# Patient Record
Sex: FEMALE | Race: WHITE | NOT HISPANIC OR LATINO | Employment: OTHER | URBAN - METROPOLITAN AREA
[De-identification: names, ages, dates, MRNs, and addresses within clinical notes are randomized per-mention and may not be internally consistent; named-entity substitution may affect disease eponyms.]

---

## 2018-06-08 ENCOUNTER — HOSPITAL ENCOUNTER (EMERGENCY)
Facility: HOSPITAL | Age: 68
Discharge: HOME | End: 2018-06-08
Attending: EMERGENCY MEDICINE | Admitting: EMERGENCY MEDICINE
Payer: COMMERCIAL

## 2018-06-08 ENCOUNTER — APPOINTMENT (EMERGENCY)
Dept: RADIOLOGY | Facility: HOSPITAL | Age: 68
End: 2018-06-08
Payer: COMMERCIAL

## 2018-06-08 VITALS
HEART RATE: 69 BPM | SYSTOLIC BLOOD PRESSURE: 127 MMHG | DIASTOLIC BLOOD PRESSURE: 57 MMHG | WEIGHT: 243.39 LBS | TEMPERATURE: 98.2 F | OXYGEN SATURATION: 99 % | RESPIRATION RATE: 18 BRPM | BODY MASS INDEX: 44.79 KG/M2 | HEIGHT: 62 IN

## 2018-06-08 DIAGNOSIS — M25.562 LEFT KNEE PAIN: Primary | ICD-10-CM

## 2018-06-08 PROCEDURE — 99284 EMERGENCY DEPT VISIT MOD MDM: CPT

## 2018-06-08 PROCEDURE — 73562 X-RAY EXAM OF KNEE 3: CPT

## 2018-06-08 RX ORDER — NAPROXEN 500 MG/1
500 TABLET ORAL 2 TIMES DAILY WITH MEALS
Qty: 30 TABLET | Refills: 0 | Status: SHIPPED | OUTPATIENT
Start: 2018-06-08 | End: 2018-08-20

## 2018-06-08 RX ORDER — MELATONIN
1000 DAILY
COMMUNITY

## 2018-06-08 RX ORDER — ASPIRIN 325 MG
325 TABLET ORAL DAILY
COMMUNITY
End: 2021-03-17 | Stop reason: DRUGHIGH

## 2018-06-08 RX ORDER — POTASSIUM CHLORIDE 20 MEQ/1
20 TABLET, EXTENDED RELEASE ORAL DAILY
COMMUNITY
End: 2021-03-17 | Stop reason: CLARIF

## 2018-06-08 RX ORDER — METOPROLOL TARTRATE 50 MG/1
50 TABLET, FILM COATED ORAL EVERY 12 HOURS SCHEDULED
COMMUNITY
End: 2021-03-17 | Stop reason: CLARIF

## 2018-06-08 RX ORDER — LEVOTHYROXINE SODIUM 0.15 MG/1
200 TABLET ORAL DAILY
COMMUNITY

## 2018-06-08 RX ORDER — HYDROCHLOROTHIAZIDE 25 MG/1
25 TABLET ORAL DAILY
COMMUNITY

## 2018-06-08 RX ORDER — NAPROXEN 250 MG/1
500 TABLET ORAL ONCE
Status: COMPLETED | OUTPATIENT
Start: 2018-06-08 | End: 2018-06-08

## 2018-06-08 RX ADMIN — NAPROXEN 500 MG: 250 TABLET ORAL at 13:49

## 2018-06-08 NOTE — ED PROVIDER NOTES
Pt Name: Gema Martinez  MRN: [id number]  [place] 1950  Age/Sex: 75 y o  female  Date of evaluation: 6/8/2018  PCP: Leonardo Villarreal, [address] [place] [address]    Chief Complaint   Patient presents with    Knee Pain     Patient states that she went shopping on Wednesday and she was just walking and felt "something" in her knee    She states that she has trouble walking on it now         GIOVANNI Perry presents to the Emergency Department complaining of left knee pain  On Wednesday she felt a sudden pain while walking down a few steps  Since then she felt that it has gotten harder and harder to walk on it  She started to use a walker but still has significant pain  HPI      Past Medical and Surgical History    Past Medical History:   Diagnosis Date    Diabetes mellitus ([facility] [address] )     Type 1    Hyperlipidemia     Hypertension        Past Surgical History:   Procedure Laterality Date    CARDIAC PACEMAKER PLACEMENT         History reviewed  No pertinent family history  Social History   Substance Use Topics    Smoking status: Never Smoker    Smokeless tobacco: Never Used    Alcohol use No              Allergies    No Known Allergies    Home Medications    Prior to Admission medications    Not on File           Review of Systems    Review of Systems   Constitutional: Negative for activity change, appetite change, chills, diaphoresis, fatigue and fever  HENT: Negative for congestion, postnasal drip, rhinorrhea, sinus pressure, sneezing and sore throat  Eyes: Negative for pain and visual disturbance  Respiratory: Negative for cough, chest tightness and shortness of breath  Cardiovascular: Negative for chest pain, palpitations and leg swelling  Gastrointestinal: Negative for abdominal distention, abdominal pain, constipation, diarrhea, nausea and vomiting  Endocrine: Negative for polydipsia, polyphagia and polyuria     Genitourinary: Negative for decreased urine volume, difficulty urinating, dysuria, flank pain, frequency and hematuria  Musculoskeletal: Negative for arthralgias, gait problem, joint swelling and neck pain  Skin: Negative for pallor and rash  Allergic/Immunologic: Negative for immunocompromised state  Neurological: Negative for syncope, speech difficulty, weakness, light-headedness, numbness and headaches  All other systems reviewed and are negative  Physical Exam      ED Triage Vitals [06/08/18 1247]   Temperature Pulse Respirations Blood Pressure SpO2   98 2 °F (36 8 °C) 69 18 127/57 99 %      Temp src Heart Rate Source Patient Position - Orthostatic VS BP Location FiO2 (%)   -- Monitor Lying Right arm --      Pain Score       Worst Possible Pain               Physical Exam   Constitutional: She is oriented to person, place, and time  She appears well-developed and well-nourished  No distress  HENT:   Head: Normocephalic and atraumatic  Nose: Nose normal    Mouth/Throat: Oropharynx is clear and moist    Eyes: Conjunctivae, EOM and lids are normal  Pupils are equal, round, and reactive to light  Neck: Normal range of motion  Neck supple  Cardiovascular: Normal rate, regular rhythm and normal heart sounds  Exam reveals no gallop and no friction rub  No murmur heard  Pulmonary/Chest: Effort normal and breath sounds normal  No accessory muscle usage  No respiratory distress  She has no wheezes  She has no rales  Abdominal: Soft  She exhibits no distension  There is no tenderness  There is no rebound and no guarding  Musculoskeletal:        Left knee: She exhibits normal range of motion, no swelling, no effusion, no ecchymosis, no deformity, no laceration, no erythema, normal patellar mobility and no bony tenderness  Tenderness found  Medial joint line and MCL tenderness noted  No lateral joint line tenderness noted  Legs:  Neurological: She is alert and oriented to person, place, and time  No cranial nerve deficit or sensory deficit     Skin: Skin is warm and dry  No rash noted  She is not diaphoretic  No erythema  Psychiatric: She has a normal mood and affect  Her speech is normal and behavior is normal  Judgment and thought content normal    Nursing note and vitals reviewed  Assessment and Plan    Flower Suarez is a 75 y o  female who presents with left knee pain  Physical examination remarkable for tenderness medially  Differential diagnosis (not completely inclusive) includes ligamentous or meniscal injury  Plan will be to perform diagnostic testing and treat symptomatically  MDM    Diagnostic Results    EKG INTERPRETATION    Labs:    No results found for this or any previous visit  All labs reviewed and utilized in the medical decision making process    Radiology:    XR knee 3 views left non injury   Final Result      No acute osseous abnormality  Workstation performed: [id number]             All radiology studies independently viewed by me and interpreted by the radiologist     Procedure    Procedures    CritCare Time      ED Course of Care and Re-Assessments      Medications   naproxen (NAPROSYN) tablet 500 mg (500 mg Oral Given 6/8/18 1349)           FINAL IMPRESSION    Final diagnoses:   Left knee pain         DISPOSITION/PLAN  Time reflects when diagnosis was documented in both MDM as applicable and the Disposition within this note     Time User Action Codes Description Comment    6/8/2018  2:10 PM Deepti Jain Add [M25 [id number]] Left knee pain       ED Disposition     ED Disposition Condition Comment    Discharge  Flower Suarez discharge to home/self care      Condition at discharge: Good        Follow-up Information     Follow up With Specialties Details Why Contact Info Additional [address] [facility] Schedule an appointment as soon as possible for a visit  [address] [place] [address] Mikie Chambers 42 [phone]       [facility] Emergency Department Emergency Medicine Go to As needed, If symptoms worsen [address]  [phone] [place] ED, [address] [place], [place], [facility]            PATIENT REFERRED TO:    [facility] [place]  [address] [facility] Laura Jin [address]  [phone]  Schedule an appointment as soon as possible for a visit      [address] [facility] Emergency Department  [address] [facility] [address]  [phone]  Go to  As needed, If symptoms worsen      DISCHARGE MEDICATIONS:    Patient's Medications   Discharge Prescriptions    No medications on file       No discharge procedures on file           DO Isrrael Vidal DO  06/08/18 1535

## 2018-06-08 NOTE — ED NOTES
Call placed to daughter, Aleksander Stroud at [phone] for ride home       Kofi Zaidi RN  06/08/18 7746

## 2018-06-08 NOTE — DISCHARGE INSTRUCTIONS

## 2018-08-15 LAB
CREAT ?TM UR-SCNC: 63.5 UMOL/L
HBA1C MFR BLD HPLC: 7.4 %
MICROALBUMIN UR-MCNC: 17.7 MG/L (ref 0–20)
MICROALBUMIN/CREAT UR: 27.9 MG/G{CREAT}

## 2018-08-20 ENCOUNTER — OFFICE VISIT (OUTPATIENT)
Dept: OBGYN CLINIC | Facility: CLINIC | Age: 68
End: 2018-08-20
Payer: COMMERCIAL

## 2018-08-20 VITALS
WEIGHT: 238.4 LBS | DIASTOLIC BLOOD PRESSURE: 78 MMHG | RESPIRATION RATE: 18 BRPM | BODY MASS INDEX: 45.01 KG/M2 | SYSTOLIC BLOOD PRESSURE: 142 MMHG | HEART RATE: 72 BPM | HEIGHT: 61 IN

## 2018-08-20 DIAGNOSIS — S76.312A HAMSTRING MUSCLE STRAIN, LEFT, INITIAL ENCOUNTER: ICD-10-CM

## 2018-08-20 DIAGNOSIS — R29.898 WEAKNESS OF BOTH HIPS: ICD-10-CM

## 2018-08-20 DIAGNOSIS — S86.812A STRAIN OF CALF MUSCLE, LEFT, INITIAL ENCOUNTER: ICD-10-CM

## 2018-08-20 DIAGNOSIS — M22.2X2 PATELLOFEMORAL SYNDROME OF LEFT KNEE: Primary | ICD-10-CM

## 2018-08-20 DIAGNOSIS — M17.12 PRIMARY OSTEOARTHRITIS OF LEFT KNEE: ICD-10-CM

## 2018-08-20 PROCEDURE — 99204 OFFICE O/P NEW MOD 45 MIN: CPT | Performed by: FAMILY MEDICINE

## 2018-08-20 RX ORDER — INSULIN ASPART INJECTION 100 [IU]/ML
64 INJECTION, SOLUTION SUBCUTANEOUS 3 TIMES DAILY
COMMUNITY
Start: 2018-07-12 | End: 2021-10-26

## 2018-08-20 NOTE — PROGRESS NOTES
Assessment/Plan:  Assessment/Plan   Diagnoses and all orders for this visit:    Patellofemoral syndrome of left knee  -     Ambulatory referral to Physical Therapy; Future    Hamstring muscle strain, left, initial encounter  -     Ambulatory referral to Physical Therapy; Future    Strain of calf muscle, left, initial encounter  -     Ambulatory referral to Physical Therapy; Future    Primary osteoarthritis of left knee  -     Ambulatory referral to Physical Therapy; Future  -     diclofenac sodium (VOLTAREN) 1 %; Apply 2 g topically 4 (four) times a day    Weakness of both hips  -     Ambulatory referral to Physical Therapy; Future    Other orders  -     FIASP FLEXTOUCH 100 units/mL injection pen; Inject 64 unit marking on U-100 syringe under the skin 3 (three) times a day        18-year-old female with left knee pain 2 months duration  Discussed with patient physical exam, radiographs, impression, and plan  X-rays noted for mild degenerative changes of the tibial femoral joint  Physical exam is noted for medial and lateral joint line tenderness as well as moderate tenderness of the distal medial lateral hamstrings, and medial gastrocnemius  Clinical impression is patellofemoral syndrome, and strain of the hamstring and calf  I discussed treatment regimen of physical therapy to which she agreed  She is to start home exercise program as soon as possible  I also prescribed her topical Voltaren gel which she is to use as directed  She will follow up with me in 6 weeks at which point she will be re-evaluated  Subjective:   Patient ID: Jaimee Licona is a 75 y o  female  Chief Complaint   Patient presents with    Left Knee - Pain         18-year-old female presents for evaluation of left knee pain of 2 months duration  Pain for started while she was walking down the stairs at the final step she twisted knee    She mainly had pain that was described as localized to the anterior and posterior aspects of the knee, intermittent, sharp, worse with bending the knee, and associated with feeling unstable  Her symptoms worsen over the next few days so she presented to emergency room which provided with a knee immobilizer  She wore the immobilizer for few days and then discontinue it as she was unable to rise from seated position  She reports that pain is worse when rising from a seated position, but then improves as she starts to walk  Knee Pain   This is a new problem  The current episode started more than 1 month ago  The problem occurs intermittently  The problem has been unchanged  Associated symptoms include arthralgias  Pertinent negatives include no abdominal pain, chest pain, chills, fever, joint swelling, numbness, rash, sore throat or weakness  Exacerbated by: Rising  She has tried rest for the symptoms  The treatment provided mild relief  The following portions of the patient's history were reviewed and updated as appropriate: She  has a past medical history of Diabetes mellitus ([facility] [address] ); Hyperlipidemia; and Hypertension  She  has a past surgical history that includes Cardiac pacemaker placement  Her family history is not on file  She  reports that she has never smoked  She has never used smokeless tobacco  She reports that she does not drink alcohol or use drugs  She has No Known Allergies       Review of Systems   Constitutional: Negative for chills and fever  HENT: Negative for sore throat  Eyes: Negative for visual disturbance  Respiratory: Negative for shortness of breath  Cardiovascular: Negative for chest pain  Gastrointestinal: Negative for abdominal pain  Genitourinary: Negative for flank pain  Musculoskeletal: Positive for arthralgias  Negative for joint swelling  Skin: Negative for rash and wound  Neurological: Negative for weakness and numbness  Hematological: Does not bruise/bleed easily  Psychiatric/Behavioral: Negative for self-injury  Objective:  Vitals:    08/20/18 1035   BP: 142/78   Pulse: 72   Resp: 18   Weight: 108 kg (238 lb 6 4 oz)   Height: 5' 1" (1 549 m)     Left Knee Exam     Tenderness   The patient is experiencing tenderness in the lateral joint line, medial joint line and medial hamstring (Gastrocnemius)  Range of Motion   Extension: normal   Flexion: 110     Muscle Strength     The patient has normal left knee strength  Tests   Varus: negative  Valgus: negative    Other   Swelling: none  Effusion: no effusion present      Right Hip Exam     Muscle Strength   Abduction: 4/5       Left Hip Exam     Muscle Strength   Abduction: 4/5     Tests   ISSAC: negative    Comments:  Negative FADDIR          Observations   Left Knee   Negative for effusion  Physical Exam   Constitutional: She is oriented to person, place, and time  She appears well-developed  No distress  HENT:   Head: Normocephalic  Eyes: Conjunctivae are normal    Neck: No tracheal deviation present  Cardiovascular: Normal rate  Pulmonary/Chest: Effort normal  No respiratory distress  Abdominal: She exhibits no distension  Musculoskeletal:        Left knee: She exhibits no effusion  Neurological: She is alert and oriented to person, place, and time  Skin: Skin is warm and dry  Psychiatric: She has a normal mood and affect  Her behavior is normal    Nursing note and vitals reviewed  I have personally reviewed pertinent films in PACS and my interpretation is Mild degenerative changes of left knee joint

## 2018-08-20 NOTE — LETTER
August 20, 2018     Michael ReyDO  [address]  [facility] [address]    Patient: Liang Leyva   YOB: 1950   Date of Visit: 8/20/2018       Dear Dr Mcneil Meals: Thank you for referring Liang Leyva to me for evaluation  Below are my notes for this consultation  If you have questions, please do not hesitate to call me  I look forward to following your patient along with you  Sincerely,        [employer], DO        CC: No Recipients  [employer], DO  8/20/2018 12:40 PM  Sign at close encounter  Assessment/Plan:  Assessment/Plan   Diagnoses and all orders for this visit:    Patellofemoral syndrome of left knee  -     Ambulatory referral to Physical Therapy; Future    Hamstring muscle strain, left, initial encounter  -     Ambulatory referral to Physical Therapy; Future    Strain of calf muscle, left, initial encounter  -     Ambulatory referral to Physical Therapy; Future    Primary osteoarthritis of left knee  -     Ambulatory referral to Physical Therapy; Future  -     diclofenac sodium (VOLTAREN) 1 %; Apply 2 g topically 4 (four) times a day    Weakness of both hips  -     Ambulatory referral to Physical Therapy; Future    Other orders  -     FIASP FLEXTOUCH 100 units/mL injection pen; Inject 64 unit marking on U-100 syringe under the skin 3 (three) times a day        65-year-old female with left knee pain 2 months duration  Discussed with patient physical exam, radiographs, impression, and plan  X-rays noted for mild degenerative changes of the tibial femoral joint  Physical exam is noted for medial and lateral joint line tenderness as well as moderate tenderness of the distal medial lateral hamstrings, and medial gastrocnemius  Clinical impression is patellofemoral syndrome, and strain of the hamstring and calf  I discussed treatment regimen of physical therapy to which she agreed  She is to start home exercise program as soon as possible    I also prescribed her topical Voltaren gel which she is to use as directed  She will follow up with me in 6 weeks at which point she will be re-evaluated  Subjective:   Patient ID: Amy Oquendo is a 75 y o  female  Chief Complaint   Patient presents with    Left Knee - Pain         70-year-old female presents for evaluation of left knee pain of 2 months duration  Pain for started while she was walking down the stairs at the final step she twisted knee  She mainly had pain that was described as localized to the anterior and posterior aspects of the knee, intermittent, sharp, worse with bending the knee, and associated with feeling unstable  Her symptoms worsen over the next few days so she presented to emergency room which provided with a knee immobilizer  She wore the immobilizer for few days and then discontinue it as she was unable to rise from seated position  She reports that pain is worse when rising from a seated position, but then improves as she starts to walk  Knee Pain   This is a new problem  The current episode started more than 1 month ago  The problem occurs intermittently  The problem has been unchanged  Associated symptoms include arthralgias  Pertinent negatives include no abdominal pain, chest pain, chills, fever, joint swelling, numbness, rash, sore throat or weakness  Exacerbated by: Rising  She has tried rest for the symptoms  The treatment provided mild relief  The following portions of the patient's history were reviewed and updated as appropriate: She  has a past medical history of Diabetes mellitus ([address] ); Hyperlipidemia; and Hypertension  She  has a past surgical history that includes Cardiac pacemaker placement  Her family history is not on file  She  reports that she has never smoked  She has never used smokeless tobacco  She reports that she does not drink alcohol or use drugs  She has No Known Allergies       Review of Systems   Constitutional: Negative for chills and fever  HENT: Negative for sore throat  Eyes: Negative for visual disturbance  Respiratory: Negative for shortness of breath  Cardiovascular: Negative for chest pain  Gastrointestinal: Negative for abdominal pain  Genitourinary: Negative for flank pain  Musculoskeletal: Positive for arthralgias  Negative for joint swelling  Skin: Negative for rash and wound  Neurological: Negative for weakness and numbness  Hematological: Does not bruise/bleed easily  Psychiatric/Behavioral: Negative for self-injury  Objective:  Vitals:    08/20/18 1035   BP: 142/78   Pulse: 72   Resp: 18   Weight: 108 kg (238 lb 6 4 oz)   Height: 5' 1" (1 549 m)     Left Knee Exam     Tenderness   The patient is experiencing tenderness in the lateral joint line, medial joint line and medial hamstring (Gastrocnemius)  Range of Motion   Extension: normal   Flexion: 110     Muscle Strength     The patient has normal left knee strength  Tests   Varus: negative  Valgus: negative    Other   Swelling: none  Effusion: no effusion present      Right Hip Exam     Muscle Strength   Abduction: 4/5       Left Hip Exam     Muscle Strength   Abduction: 4/5     Tests   ISSAC: negative    Comments:  Negative FADDIR          Observations   Left Knee   Negative for effusion  Physical Exam   Constitutional: She is oriented to person, place, and time  She appears well-developed  No distress  HENT:   Head: Normocephalic  Eyes: Conjunctivae are normal    Neck: No tracheal deviation present  Cardiovascular: Normal rate  Pulmonary/Chest: Effort normal  No respiratory distress  Abdominal: She exhibits no distension  Musculoskeletal:        Left knee: She exhibits no effusion  Neurological: She is alert and oriented to person, place, and time  Skin: Skin is warm and dry  Psychiatric: She has a normal mood and affect  Her behavior is normal    Nursing note and vitals reviewed        I have personally reviewed pertinent films in PACS and my interpretation is Mild degenerative changes of left knee joint

## 2019-01-14 ENCOUNTER — OFFICE VISIT (OUTPATIENT)
Dept: CARDIOLOGY CLINIC | Facility: CLINIC | Age: 69
End: 2019-01-14
Payer: COMMERCIAL

## 2019-01-14 VITALS
BODY MASS INDEX: 46.44 KG/M2 | WEIGHT: 246 LBS | SYSTOLIC BLOOD PRESSURE: 134 MMHG | DIASTOLIC BLOOD PRESSURE: 84 MMHG | HEIGHT: 61 IN

## 2019-01-14 DIAGNOSIS — I45.9 HEART BLOCK: Primary | ICD-10-CM

## 2019-01-14 DIAGNOSIS — E66.01 CLASS 3 SEVERE OBESITY DUE TO EXCESS CALORIES WITHOUT SERIOUS COMORBIDITY WITH BODY MASS INDEX (BMI) OF 45.0 TO 49.9 IN ADULT (HCC): ICD-10-CM

## 2019-01-14 DIAGNOSIS — I10 BENIGN ESSENTIAL HTN: ICD-10-CM

## 2019-01-14 PROBLEM — E66.813 CLASS 3 SEVERE OBESITY DUE TO EXCESS CALORIES WITHOUT SERIOUS COMORBIDITY WITH BODY MASS INDEX (BMI) OF 45.0 TO 49.9 IN ADULT (HCC): Status: ACTIVE | Noted: 2019-01-14

## 2019-01-14 PROCEDURE — 93000 ELECTROCARDIOGRAM COMPLETE: CPT | Performed by: INTERNAL MEDICINE

## 2019-01-14 PROCEDURE — 99214 OFFICE O/P EST MOD 30 MIN: CPT | Performed by: INTERNAL MEDICINE

## 2019-01-14 NOTE — ASSESSMENT & PLAN NOTE
She told me her diet which sound excellent  Unable to lose weight but despite this she seems content

## 2019-01-14 NOTE — PROGRESS NOTES
Patient ID: Bob Kahn is a 70 y o  female  Plan:      Benign essential HTN  Well controlled  Heart block  Dual-chamber pacemaker for prior second-degree heart block  This was placed in [place]  Will continue surveillance  Class 3 severe obesity due to excess calories without serious comorbidity with body mass index (BMI) of 45 0 to 49 9 in [facility])  She told me her diet which sound excellent  Unable to lose weight but despite this she seems content  Follow up Plan:  One year EKG and follow-up visit  HPI:   The patient is seen in follow-up today regarding intermittent heart block, hypertension, and obesity  Since her last visit she had transient renal failure which apparently improved after a stent was placed in her ureter  She has felt well in the last few months  Walking is difficult because of fibromyalgia  She has been unable to lose weight and is a bit frustrated by this  No chest pain or chest pressure  Results for orders placed or performed in visit on 01/14/19   POCT ECG    Impression    NSR  WNL  Most recent or relevant cardiac/vascular testing:    Dual-chamber Medtronic pacemaker:  6/28/2012  Lexiscan Myoview 3/9/2016:  Normal     Echocardiogram 8/16/2011:  Normal       Past Surgical History:   Procedure Laterality Date    A-V CARDIAC PACEMAKER INSERTION  06/2012    dual chamber Medtronic    CARDIAC PACEMAKER PLACEMENT       CMP: No results found for: NA, K, CL, CO2, BUN, CREATININE, GLUCOSE, EGFR    Lipid Profile: No results found for: CHOL, TRIG, HDL, LDL      Review of Systems   10  point ROS  was otherwise non pertinent or negative except as per HPI or as below  Gait:  Slow but normal         Objective:     /84   Ht 5' 1" (1 549 m)   Wt 112 kg (246 lb)   BMI 46 48 kg/m²     PHYSICAL EXAM:    General:  Normal appearance in no distress  Eyes:  Anicteric  Oral mucosa:  Moist   Neck:  No JVD  Carotid upstrokes are brisk without bruits    No masses  Chest:  Clear to auscultation and percussion  Cardiac:  Normal PMI  Normal S1 and S2  No murmur gallop or rub  Abdomen:  Soft and nontender  No palpable organomegaly or aortic enlargement  Extremities:  No peripheral edema  Musculoskeletal:  Symmetric  Vascular:  Femoral pulses are brisk without bruits  Popliteal pulses are intact bilaterally  Pedal pulses are intact  Neuro:  Grossly symmetric  Psych:  Alert and oriented x3          Current Outpatient Prescriptions:     aspirin 325 mg tablet, Take 325 mg by mouth daily, Disp: , Rfl:     cholecalciferol (VITAMIN D3) 1,000 units tablet, Take 1,000 Units by mouth daily, Disp: , Rfl:     diclofenac sodium (VOLTAREN) 1 %, Apply 2 g topically 4 (four) times a day, Disp: 1 Tube, Rfl: 2    FIASP FLEXTOUCH 100 units/mL injection pen, Inject 64 unit marking on U-100 syringe under the skin 3 (three) times a day, Disp: , Rfl:     hydrochlorothiazide (HYDRODIURIL) 25 mg tablet, Take 25 mg by mouth daily, Disp: , Rfl:     insulin aspart (NovoLOG) 100 units/mL injection, Inject 5 Units under the skin 3 (three) times a day before meals, Disp: , Rfl:     Insulin Glargine (TOUJEO SOLOSTAR SC), Inject 24 Units under the skin daily at bedtime, Disp: , Rfl:     levothyroxine 150 mcg tablet, Take 150 mcg by mouth daily, Disp: , Rfl:     metoprolol tartrate (LOPRESSOR) 50 mg tablet, Take 50 mg by mouth every 12 (twelve) hours  , Disp: , Rfl:     potassium chloride (K-DUR,KLOR-CON) 20 mEq tablet, Take 20 mEq by mouth daily, Disp: , Rfl:   No Known Allergies  Past Medical History:   Diagnosis Date    Atrial fibrillation (HCC)     paroxsysmal    Diabetes mellitus ([facility] [address] )     Type 1    History of echocardiogram 08/16/2011    EF 55%, Normal study    History of nuclear stress test 03/09/2016    EF 63%, Normal     Hyperlipidemia     Hypertension     Second degree heart block 2012    s/p dual-chamber Medtronic pacemaker 6/2012           History   Smoking Status  Never Smoker   Smokeless Tobacco    Never Used

## 2019-01-14 NOTE — ASSESSMENT & PLAN NOTE
Dual-chamber pacemaker for prior second-degree heart block  This was placed in [place]  Will continue surveillance

## 2020-02-05 ENCOUNTER — OFFICE VISIT (OUTPATIENT)
Dept: CARDIOLOGY CLINIC | Facility: CLINIC | Age: 70
End: 2020-02-05
Payer: MEDICARE

## 2020-02-05 VITALS
SYSTOLIC BLOOD PRESSURE: 130 MMHG | DIASTOLIC BLOOD PRESSURE: 78 MMHG | HEART RATE: 63 BPM | HEIGHT: 61 IN | WEIGHT: 236 LBS | BODY MASS INDEX: 44.56 KG/M2

## 2020-02-05 DIAGNOSIS — I45.9 HEART BLOCK: ICD-10-CM

## 2020-02-05 DIAGNOSIS — I10 BENIGN ESSENTIAL HTN: Primary | ICD-10-CM

## 2020-02-05 DIAGNOSIS — I48.0 PAROXYSMAL ATRIAL FIBRILLATION (HCC): ICD-10-CM

## 2020-02-05 PROCEDURE — 99214 OFFICE O/P EST MOD 30 MIN: CPT | Performed by: INTERNAL MEDICINE

## 2020-02-05 PROCEDURE — 93000 ELECTROCARDIOGRAM COMPLETE: CPT | Performed by: INTERNAL MEDICINE

## 2020-02-05 RX ORDER — METOPROLOL SUCCINATE 50 MG/1
50 TABLET, EXTENDED RELEASE ORAL 2 TIMES DAILY
COMMUNITY
Start: 2008-04-29

## 2020-02-05 NOTE — ASSESSMENT & PLAN NOTE
Recently she embarked on a low carb diet and she states that she is losing weight and downwardy adjusting her insulin

## 2020-02-05 NOTE — ASSESSMENT & PLAN NOTE
Intermittent  Dual-chamber Medtronic pacemaker placed in June of 2012  It does not seem that we have checked it recently and I am going to make arrangements for this

## 2020-02-05 NOTE — ASSESSMENT & PLAN NOTE
This has been listed but not confirmed  There has been no prior evidence by pacer surveillance and therefore I had in the past stopped her warfarin  Pacer check will be done shortly

## 2020-02-05 NOTE — PROGRESS NOTES
Patient ID: Moe Nicholas is a 78 y o  female  Plan:      Heart block  Intermittent  Dual-chamber Medtronic pacemaker placed in June of 2012  It does not seem that we have checked it recently and I am going to make arrangements for this  Benign essential HTN  Well controlled  Class 3 severe obesity due to excess calories without serious comorbidity with body mass index (BMI) of 45 0 to 49 9 in [facility])  Recently she embarked on a low carb diet and she states that she is losing weight and downwardy adjusting her insulin  Paroxysmal atrial fibrillation (HCC)  This has been listed but not confirmed  There has been no prior evidence by pacer surveillance and therefore I had in the past stopped her warfarin  Pacer check will be done shortly  Follow up Plan:  If pacer checks are okay then follow-up visit and EKG in 1 year  HPI:  The patient is seen in follow-up today regarding heart block, hypertension, being overweight, and possible prior paroxysmal atrial fibrillation  Since the last visit she has felt well apart from chronic back pain and inability to lie down  There has been no chest pain or chest pressure  No syncope or near syncope  Results for orders placed or performed in visit on 02/05/20   POCT ECG    Impression    Normal sinus rhythm  Within normal limits  Most recent or relevant cardiac/vascular testing:    Myoview 03/09/2016:  Normal       Past Surgical History:   Procedure Laterality Date    A-V CARDIAC PACEMAKER INSERTION  06/2012    dual chamber Medtronic    CARDIAC PACEMAKER PLACEMENT       CMP: No results found for: NA, K, CL, CO2, BUN, CREATININE, GLUCOSE, EGFR    Lipid Profile: No results found for: CHOL, TRIG, HDL, LDL      Review of Systems   10  point ROS  was otherwise non pertinent or negative except as per HPI or as below  Gait:  Slow        Objective:     /78   Pulse 63   Ht 5' 1" (1 549 m)   Wt 107 kg (236 lb)   BMI 44 59 kg/m² PHYSICAL EXAM:    General:  Normal appearance in no distress  Eyes:  Anicteric  Oral mucosa:  Moist   Neck:  No JVD  Carotid upstrokes are brisk without bruits  No masses  Chest:  Clear to auscultation and percussion  Cardiac:  Normal PMI  Normal S1 and S2  No murmur gallop or rub  Abdomen:  Soft and nontender  No palpable organomegaly or aortic enlargement  Extremities:  No peripheral edema  Musculoskeletal:  Symmetric  Vascular: Pedal pulses are intact  Neuro:  Grossly symmetric  Psych:  Alert and oriented x3          Current Outpatient Medications:     aspirin 325 mg tablet, Take 325 mg by mouth daily, Disp: , Rfl:     cholecalciferol (VITAMIN D3) 1,000 units tablet, Take 1,000 Units by mouth daily, Disp: , Rfl:     FIASP FLEXTOUCH 100 units/mL injection pen, Inject 64 unit marking on U-100 syringe under the skin 3 (three) times a day, Disp: , Rfl:     hydrochlorothiazide (HYDRODIURIL) 25 mg tablet, Take 25 mg by mouth daily, Disp: , Rfl:     Insulin Glargine (TOUJEO SOLOSTAR SC), Inject 24 Units under the skin as needed , Disp: , Rfl:     levothyroxine 150 mcg tablet, Take 200 mcg by mouth daily , Disp: , Rfl:     metoprolol tartrate (LOPRESSOR) 50 mg tablet, Take 50 mg by mouth every 12 (twelve) hours  , Disp: , Rfl:     diclofenac sodium (VOLTAREN) 1 %, Apply 2 g topically 4 (four) times a day (Patient not taking: Reported on 2/5/2020), Disp: 1 Tube, Rfl: 2    insulin aspart (NovoLOG) 100 units/mL injection, Inject 5 Units under the skin 3 (three) times a day before meals, Disp: , Rfl:     metoprolol succinate (TOPROL XL) 50 mg 24 hr tablet, Take 50 mg by mouth 2 (two) times a day, Disp: , Rfl:     potassium chloride (K-DUR,KLOR-CON) 20 mEq tablet, Take 20 mEq by mouth daily, Disp: , Rfl:   No Known Allergies  Past Medical History:   Diagnosis Date    Atrial fibrillation (HCC)     paroxsysmal    Diabetes mellitus ([facility] [address] )     Type 1    History of echocardiogram 08/16/2011    EF 55%, Normal study    History of nuclear stress test 03/09/2016    EF 63%, Normal     Hyperlipidemia     Hypertension     Second degree heart block 2012    s/p dual-chamber Medtronic pacemaker 6/2012           Social History     Tobacco Use   Smoking Status Never Smoker   Smokeless Tobacco Never Used

## 2020-03-17 ENCOUNTER — IN-CLINIC DEVICE VISIT (OUTPATIENT)
Dept: CARDIOLOGY CLINIC | Facility: CLINIC | Age: 70
End: 2020-03-17
Payer: MEDICARE

## 2020-03-17 DIAGNOSIS — I49.5 SICK SINUS SYNDROME (HCC): Primary | ICD-10-CM

## 2020-03-17 DIAGNOSIS — Z45.010 ENCOUNTER FOR CHECKING AND TESTING OF CARDIAC PACEMAKER PULSE GENERATOR (BATTERY): ICD-10-CM

## 2020-03-17 PROCEDURE — 93281 PM DEVICE PROGR EVAL MULTI: CPT | Performed by: INTERNAL MEDICINE

## 2020-03-19 NOTE — PROGRESS NOTES
Device check in person pacer  No events  Normal battery function  Chronic high atrial threshold         Current Outpatient Medications:     aspirin 325 mg tablet, Take 325 mg by mouth daily, Disp: , Rfl:     cholecalciferol (VITAMIN D3) 1,000 units tablet, Take 1,000 Units by mouth daily, Disp: , Rfl:     diclofenac sodium (VOLTAREN) 1 %, Apply 2 g topically 4 (four) times a day (Patient not taking: Reported on 2/5/2020), Disp: 1 Tube, Rfl: 2    FIASP FLEXTOUCH 100 units/mL injection pen, Inject 64 unit marking on U-100 syringe under the skin 3 (three) times a day, Disp: , Rfl:     hydrochlorothiazide (HYDRODIURIL) 25 mg tablet, Take 25 mg by mouth daily, Disp: , Rfl:     insulin aspart (NovoLOG) 100 units/mL injection, Inject 5 Units under the skin 3 (three) times a day before meals, Disp: , Rfl:     Insulin Glargine (TOUJEO SOLOSTAR SC), Inject 24 Units under the skin as needed , Disp: , Rfl:     levothyroxine 150 mcg tablet, Take 200 mcg by mouth daily , Disp: , Rfl:     metoprolol succinate (TOPROL XL) 50 mg 24 hr tablet, Take 50 mg by mouth 2 (two) times a day, Disp: , Rfl:     metoprolol tartrate (LOPRESSOR) 50 mg tablet, Take 50 mg by mouth every 12 (twelve) hours  , Disp: , Rfl:     potassium chloride (K-DUR,KLOR-CON) 20 mEq tablet, Take 20 mEq by mouth daily, Disp: , Rfl:

## 2020-10-20 ENCOUNTER — IN-CLINIC DEVICE VISIT (OUTPATIENT)
Dept: CARDIOLOGY CLINIC | Facility: CLINIC | Age: 70
End: 2020-10-20
Payer: MEDICARE

## 2020-10-20 DIAGNOSIS — Z45.010 ENCOUNTER FOR CHECKING AND TESTING OF CARDIAC PACEMAKER PULSE GENERATOR (BATTERY): ICD-10-CM

## 2020-10-20 DIAGNOSIS — I49.5 SICK SINUS SYNDROME (HCC): Primary | ICD-10-CM

## 2020-10-20 PROCEDURE — 93280 PM DEVICE PROGR EVAL DUAL: CPT | Performed by: INTERNAL MEDICINE

## 2021-03-17 ENCOUNTER — OFFICE VISIT (OUTPATIENT)
Dept: CARDIOLOGY CLINIC | Facility: CLINIC | Age: 71
End: 2021-03-17
Payer: MEDICARE

## 2021-03-17 VITALS
BODY MASS INDEX: 46.93 KG/M2 | HEIGHT: 62 IN | HEART RATE: 64 BPM | SYSTOLIC BLOOD PRESSURE: 128 MMHG | WEIGHT: 255 LBS | OXYGEN SATURATION: 99 % | DIASTOLIC BLOOD PRESSURE: 80 MMHG | RESPIRATION RATE: 18 BRPM

## 2021-03-17 DIAGNOSIS — I45.9 HEART BLOCK: ICD-10-CM

## 2021-03-17 DIAGNOSIS — I48.0 PAROXYSMAL ATRIAL FIBRILLATION (HCC): Primary | ICD-10-CM

## 2021-03-17 DIAGNOSIS — I10 BENIGN ESSENTIAL HTN: ICD-10-CM

## 2021-03-17 PROCEDURE — 99214 OFFICE O/P EST MOD 30 MIN: CPT | Performed by: INTERNAL MEDICINE

## 2021-03-17 PROCEDURE — 93000 ELECTROCARDIOGRAM COMPLETE: CPT | Performed by: INTERNAL MEDICINE

## 2021-03-17 RX ORDER — GLIPIZIDE 5 MG/1
TABLET ORAL
COMMUNITY
Start: 2021-01-04

## 2021-03-17 NOTE — ASSESSMENT & PLAN NOTE
Not seen on any device check  Unclear where this diagnosis comes from  At this point aspirin can be stopped

## 2021-03-17 NOTE — PROGRESS NOTES
Patient ID: Sydnee Lopez is a 69 y o  female  Plan:      Benign essential HTN  Well controlled  Class 3 severe obesity due to excess calories without serious comorbidity with body mass index (BMI) of 45 0 to 49 9 in adult [facility])  Very difficult to lose weight as she can not do much activity because of orthopedic issues  Paroxysmal atrial fibrillation (HCC)  Not seen on any device check  Unclear where this diagnosis comes from  At this point aspirin can be stopped  Heart block  Intermittent  Device surveillance continues  Follow up Plan/Other summary comments:  One year EKG and follow-up visit  The only change I made today was to stop aspirin  HPI:  Patient is seen in follow-up regarding the above issues  Since last year's visit she has felt well  That is she has felt well from a cardiac perspective  No chest pain or chest pressure  No palpitations  No syncope or near syncope  Balance remains difficult  She has lots of orthopedic issues including back and joints  A Medtronic pacemaker was placed in June of 2012  Results for orders placed or performed in visit on 03/17/21   POCT ECG    Impression    NSR  WNL  Most recent or relevant cardiac/vascular testing:    Myoview 03/09/2016:  Normal         Past Surgical History:   Procedure Laterality Date    A-V CARDIAC PACEMAKER INSERTION  06/2012    dual chamber Medtronic    CARDIAC PACEMAKER PLACEMENT       CMP: No results found for: NA, K, CL, CO2, BUN, CREATININE, GLUCOSE, EGFR    Lipid Profile: No results found for: CHOL, TRIG, HDL, LDL      Review of Systems   10  point ROS  was otherwise non pertinent or negative except as per HPI or as below  Gait:  Fair        Objective:     /80   Pulse 64   Resp 18   Ht 5' 2" (1 575 m)   Wt 116 kg (255 lb)   SpO2 99%   BMI 46 64 kg/m²     PHYSICAL EXAM:    General:  Normal appearance in no distress  Eyes:  Anicteric  Oral mucosa:  Moist   Neck:  No JVD   Carotid upstrokes are brisk without bruits  No masses  Chest:  Clear to auscultation and percussion  Device site well-healed left subclavian region  Cardiac:  No palpable PMI  Normal S1 and S2  No murmur gallop or rub  Abdomen:  Soft and nontender  No palpable organomegaly or aortic enlargement  Extremities:  No peripheral edema  Musculoskeletal:  Symmetric  Vascular: Popliteal pulses are intact bilaterally  Pedal pulses are intact  Neuro:  Grossly symmetric  Psych:  Alert and oriented x3          Current Outpatient Medications:     cholecalciferol (VITAMIN D3) 1,000 units tablet, Take 1,000 Units by mouth daily, Disp: , Rfl:     FIASP FLEXTOUCH 100 units/mL injection pen, Inject 64 unit marking on U-100 syringe under the skin 3 (three) times a day, Disp: , Rfl:     glipiZIDE (GLUCOTROL) 5 mg tablet, TAKE 1 TABLET BY MOUTH TWICE A DAY  BEFORE BREAKFAST AND SUPPER (2 TABLETS TOTAL DAILY), Disp: , Rfl:     hydrochlorothiazide (HYDRODIURIL) 25 mg tablet, Take 25 mg by mouth daily, Disp: , Rfl:     Insulin Glargine (TOUJEO SOLOSTAR SC), Inject 24 Units under the skin as needed , Disp: , Rfl:     levothyroxine 150 mcg tablet, Take 200 mcg by mouth daily , Disp: , Rfl:     metoprolol succinate (TOPROL XL) 50 mg 24 hr tablet, Take 50 mg by mouth 2 (two) times a day, Disp: , Rfl:     diclofenac sodium (VOLTAREN) 1 %, Apply 2 g topically 4 (four) times a day (Patient not taking: Reported on 2/5/2020), Disp: 1 Tube, Rfl: 2    insulin aspart (NovoLOG) 100 units/mL injection, Inject 5 Units under the skin 3 (three) times a day before meals, Disp: , Rfl:   No Known Allergies  Past Medical History:   Diagnosis Date    Atrial fibrillation (HCC)     paroxsysmal    Diabetes mellitus ([facility] [address] )     Type 1    History of echocardiogram 08/16/2011    EF 55%, Normal study    History of nuclear stress test 03/09/2016    EF 63%, Normal     Hyperlipidemia     Hypertension     Second degree heart block 2012    s/p dual-chamber Medtronic pacemaker 6/2012           Social History     Tobacco Use   Smoking Status Never Smoker   Smokeless Tobacco Never Used

## 2021-06-15 ENCOUNTER — IN-CLINIC DEVICE VISIT (OUTPATIENT)
Dept: CARDIOLOGY CLINIC | Facility: CLINIC | Age: 71
End: 2021-06-15
Payer: MEDICARE

## 2021-06-15 DIAGNOSIS — Z45.010 ENCOUNTER FOR CHECKING AND TESTING OF CARDIAC PACEMAKER PULSE GENERATOR (BATTERY): ICD-10-CM

## 2021-06-15 DIAGNOSIS — I49.5 SICK SINUS SYNDROME (HCC): Primary | ICD-10-CM

## 2021-06-15 PROCEDURE — 93280 PM DEVICE PROGR EVAL DUAL: CPT | Performed by: INTERNAL MEDICINE

## 2021-06-25 NOTE — PROGRESS NOTES
Device check in person pacer  Chronic high A threshold  No events  Normal battery function        Current Outpatient Medications:     cholecalciferol (VITAMIN D3) 1,000 units tablet, Take 1,000 Units by mouth daily, Disp: , Rfl:     diclofenac sodium (VOLTAREN) 1 %, Apply 2 g topically 4 (four) times a day (Patient not taking: Reported on 2/5/2020), Disp: 1 Tube, Rfl: 2    FIASP FLEXTOUCH 100 units/mL injection pen, Inject 64 unit marking on U-100 syringe under the skin 3 (three) times a day, Disp: , Rfl:     glipiZIDE (GLUCOTROL) 5 mg tablet, TAKE 1 TABLET BY MOUTH TWICE A DAY  BEFORE BREAKFAST AND SUPPER (2 TABLETS TOTAL DAILY), Disp: , Rfl:     hydrochlorothiazide (HYDRODIURIL) 25 mg tablet, Take 25 mg by mouth daily, Disp: , Rfl:     insulin aspart (NovoLOG) 100 units/mL injection, Inject 5 Units under the skin 3 (three) times a day before meals, Disp: , Rfl:     Insulin Glargine (TOUJEO SOLOSTAR SC), Inject 24 Units under the skin as needed , Disp: , Rfl:     levothyroxine 150 mcg tablet, Take 200 mcg by mouth daily , Disp: , Rfl:     metoprolol succinate (TOPROL XL) 50 mg 24 hr tablet, Take 50 mg by mouth 2 (two) times a day, Disp: , Rfl:

## 2021-10-24 PROCEDURE — 99284 EMERGENCY DEPT VISIT MOD MDM: CPT

## 2021-10-25 ENCOUNTER — HOSPITAL ENCOUNTER (OUTPATIENT)
Facility: HOSPITAL | Age: 71
Setting detail: OBSERVATION
Discharge: HOME | End: 2021-10-26
Attending: EMERGENCY MEDICINE | Admitting: FAMILY MEDICINE
Payer: MEDICARE

## 2021-10-25 ENCOUNTER — APPOINTMENT (EMERGENCY)
Dept: RADIOLOGY | Facility: HOSPITAL | Age: 71
End: 2021-10-25
Payer: MEDICARE

## 2021-10-25 DIAGNOSIS — R73.9 HYPERGLYCEMIA: Primary | ICD-10-CM

## 2021-10-25 DIAGNOSIS — E66.01 CLASS 3 SEVERE OBESITY DUE TO EXCESS CALORIES WITHOUT SERIOUS COMORBIDITY WITH BODY MASS INDEX (BMI) OF 45.0 TO 49.9 IN ADULT (HCC): ICD-10-CM

## 2021-10-25 DIAGNOSIS — N17.9 AKI (ACUTE KIDNEY INJURY) (HCC): ICD-10-CM

## 2021-10-25 PROBLEM — U09.9 PERSISTENT SHORTNESS OF BREATH AFTER COVID-19: Status: ACTIVE | Noted: 2021-10-25

## 2021-10-25 PROBLEM — R06.02 PERSISTENT SHORTNESS OF BREATH AFTER COVID-19: Status: ACTIVE | Noted: 2021-10-25

## 2021-10-25 LAB
ALBUMIN SERPL BCP-MCNC: 3.7 G/DL (ref 3.5–5)
ALP SERPL-CCNC: 67 U/L (ref 46–116)
ALT SERPL W P-5'-P-CCNC: 16 U/L (ref 12–78)
ANION GAP SERPL CALCULATED.3IONS-SCNC: 12 MMOL/L (ref 4–13)
AST SERPL W P-5'-P-CCNC: 15 U/L (ref 5–45)
ATRIAL RATE: 84 BPM
BASE EX.OXY STD BLDV CALC-SCNC: 65.1 % (ref 60–80)
BASE EXCESS BLDV CALC-SCNC: 4.5 MMOL/L
BASOPHILS # BLD AUTO: 0.02 THOUSANDS/ΜL (ref 0–0.1)
BASOPHILS NFR BLD AUTO: 0 % (ref 0–1)
BETA-HYDROXYBUTYRATE: 0.2 MMOL/L
BILIRUB SERPL-MCNC: 0.95 MG/DL (ref 0.2–1)
BUN SERPL-MCNC: 59 MG/DL (ref 5–25)
CALCIUM SERPL-MCNC: 10.1 MG/DL (ref 8.3–10.1)
CHLORIDE SERPL-SCNC: 86 MMOL/L (ref 100–108)
CO2 SERPL-SCNC: 30 MMOL/L (ref 21–32)
CREAT SERPL-MCNC: 2.27 MG/DL (ref 0.6–1.3)
EOSINOPHIL # BLD AUTO: 0.03 THOUSAND/ΜL (ref 0–0.61)
EOSINOPHIL NFR BLD AUTO: 0 % (ref 0–6)
ERYTHROCYTE [DISTWIDTH] IN BLOOD BY AUTOMATED COUNT: 12.9 % (ref 11.6–15.1)
GFR SERPL CREATININE-BSD FRML MDRD: 21 ML/MIN/1.73SQ M
GLUCOSE SERPL-MCNC: 260 MG/DL (ref 65–140)
GLUCOSE SERPL-MCNC: 266 MG/DL (ref 65–140)
GLUCOSE SERPL-MCNC: 328 MG/DL (ref 65–140)
GLUCOSE SERPL-MCNC: 358 MG/DL (ref 65–140)
GLUCOSE SERPL-MCNC: 406 MG/DL (ref 65–140)
GLUCOSE SERPL-MCNC: 427 MG/DL (ref 65–140)
GLUCOSE SERPL-MCNC: 675 MG/DL (ref 65–140)
GLUCOSE SERPL-MCNC: >500 MG/DL (ref 65–140)
HCO3 BLDV-SCNC: 29.9 MMOL/L (ref 24–30)
HCT VFR BLD AUTO: 46.8 % (ref 34.8–46.1)
HGB BLD-MCNC: 16.1 G/DL (ref 11.5–15.4)
IMM GRANULOCYTES # BLD AUTO: 0.14 THOUSAND/UL (ref 0–0.2)
IMM GRANULOCYTES NFR BLD AUTO: 1 % (ref 0–2)
LYMPHOCYTES # BLD AUTO: 1.65 THOUSANDS/ΜL (ref 0.6–4.47)
LYMPHOCYTES NFR BLD AUTO: 12 % (ref 14–44)
MCH RBC QN AUTO: 29.3 PG (ref 26.8–34.3)
MCHC RBC AUTO-ENTMCNC: 34.4 G/DL (ref 31.4–37.4)
MCV RBC AUTO: 85 FL (ref 82–98)
MONOCYTES # BLD AUTO: 1.18 THOUSAND/ΜL (ref 0.17–1.22)
MONOCYTES NFR BLD AUTO: 9 % (ref 4–12)
NEUTROPHILS # BLD AUTO: 10.69 THOUSANDS/ΜL (ref 1.85–7.62)
NEUTS SEG NFR BLD AUTO: 78 % (ref 43–75)
NRBC BLD AUTO-RTO: 0 /100 WBCS
NT-PROBNP SERPL-MCNC: 471 PG/ML
O2 CT BLDV-SCNC: 15.1 ML/DL
P AXIS: 23 DEGREES
PCO2 BLDV: 46.8 MM HG (ref 42–50)
PH BLDV: 7.42 [PH] (ref 7.3–7.4)
PLATELET # BLD AUTO: 273 THOUSANDS/UL (ref 149–390)
PMV BLD AUTO: 11.3 FL (ref 8.9–12.7)
PO2 BLDV: 35.8 MM HG (ref 35–45)
POTASSIUM SERPL-SCNC: 3.9 MMOL/L (ref 3.5–5.3)
PR INTERVAL: 144 MS
PROT SERPL-MCNC: 7.8 G/DL (ref 6.4–8.2)
QRS AXIS: 3 DEGREES
QRSD INTERVAL: 92 MS
QT INTERVAL: 380 MS
QTC INTERVAL: 449 MS
RBC # BLD AUTO: 5.5 MILLION/UL (ref 3.81–5.12)
SODIUM SERPL-SCNC: 128 MMOL/L (ref 136–145)
T WAVE AXIS: 20 DEGREES
TROPONIN I SERPL-MCNC: <0.02 NG/ML
VENTRICULAR RATE: 84 BPM
WBC # BLD AUTO: 13.71 THOUSAND/UL (ref 4.31–10.16)

## 2021-10-25 PROCEDURE — 83880 ASSAY OF NATRIURETIC PEPTIDE: CPT | Performed by: PHYSICIAN ASSISTANT

## 2021-10-25 PROCEDURE — 85025 COMPLETE CBC W/AUTO DIFF WBC: CPT | Performed by: PHYSICIAN ASSISTANT

## 2021-10-25 PROCEDURE — 96361 HYDRATE IV INFUSION ADD-ON: CPT

## 2021-10-25 PROCEDURE — 36415 COLL VENOUS BLD VENIPUNCTURE: CPT | Performed by: PHYSICIAN ASSISTANT

## 2021-10-25 PROCEDURE — 96374 THER/PROPH/DIAG INJ IV PUSH: CPT

## 2021-10-25 PROCEDURE — 82948 REAGENT STRIP/BLOOD GLUCOSE: CPT

## 2021-10-25 PROCEDURE — 99220 PR INITIAL OBSERVATION CARE/DAY 70 MINUTES: CPT | Performed by: INTERNAL MEDICINE

## 2021-10-25 PROCEDURE — 82010 KETONE BODYS QUAN: CPT | Performed by: PHYSICIAN ASSISTANT

## 2021-10-25 PROCEDURE — 93005 ELECTROCARDIOGRAM TRACING: CPT

## 2021-10-25 PROCEDURE — 84484 ASSAY OF TROPONIN QUANT: CPT | Performed by: PHYSICIAN ASSISTANT

## 2021-10-25 PROCEDURE — 80053 COMPREHEN METABOLIC PANEL: CPT | Performed by: PHYSICIAN ASSISTANT

## 2021-10-25 PROCEDURE — 99285 EMERGENCY DEPT VISIT HI MDM: CPT | Performed by: PHYSICIAN ASSISTANT

## 2021-10-25 PROCEDURE — 99204 OFFICE O/P NEW MOD 45 MIN: CPT | Performed by: INTERNAL MEDICINE

## 2021-10-25 PROCEDURE — 93010 ELECTROCARDIOGRAM REPORT: CPT | Performed by: INTERNAL MEDICINE

## 2021-10-25 PROCEDURE — 71046 X-RAY EXAM CHEST 2 VIEWS: CPT

## 2021-10-25 PROCEDURE — 82805 BLOOD GASES W/O2 SATURATION: CPT | Performed by: PHYSICIAN ASSISTANT

## 2021-10-25 RX ORDER — ACETAMINOPHEN 325 MG/1
650 TABLET ORAL EVERY 6 HOURS PRN
Status: DISCONTINUED | OUTPATIENT
Start: 2021-10-25 | End: 2021-10-26 | Stop reason: HOSPADM

## 2021-10-25 RX ORDER — LEVOTHYROXINE SODIUM 0.1 MG/1
200 TABLET ORAL
Status: DISCONTINUED | OUTPATIENT
Start: 2021-10-25 | End: 2021-10-26 | Stop reason: HOSPADM

## 2021-10-25 RX ORDER — DOCUSATE SODIUM 100 MG/1
100 CAPSULE, LIQUID FILLED ORAL 2 TIMES DAILY
Status: DISCONTINUED | OUTPATIENT
Start: 2021-10-25 | End: 2021-10-26 | Stop reason: HOSPADM

## 2021-10-25 RX ORDER — METOPROLOL SUCCINATE 50 MG/1
50 TABLET, EXTENDED RELEASE ORAL 2 TIMES DAILY
Status: DISCONTINUED | OUTPATIENT
Start: 2021-10-25 | End: 2021-10-26 | Stop reason: HOSPADM

## 2021-10-25 RX ORDER — INSULIN GLARGINE 100 [IU]/ML
24 INJECTION, SOLUTION SUBCUTANEOUS EVERY MORNING
Status: DISCONTINUED | OUTPATIENT
Start: 2021-10-25 | End: 2021-10-26

## 2021-10-25 RX ORDER — MAGNESIUM HYDROXIDE/ALUMINUM HYDROXICE/SIMETHICONE 120; 1200; 1200 MG/30ML; MG/30ML; MG/30ML
30 SUSPENSION ORAL EVERY 6 HOURS PRN
Status: DISCONTINUED | OUTPATIENT
Start: 2021-10-25 | End: 2021-10-26 | Stop reason: HOSPADM

## 2021-10-25 RX ORDER — SODIUM CHLORIDE, SODIUM LACTATE, POTASSIUM CHLORIDE, CALCIUM CHLORIDE 600; 310; 30; 20 MG/100ML; MG/100ML; MG/100ML; MG/100ML
75 INJECTION, SOLUTION INTRAVENOUS CONTINUOUS
Status: DISCONTINUED | OUTPATIENT
Start: 2021-10-25 | End: 2021-10-26 | Stop reason: HOSPADM

## 2021-10-25 RX ORDER — ONDANSETRON 2 MG/ML
4 INJECTION INTRAMUSCULAR; INTRAVENOUS EVERY 6 HOURS PRN
Status: DISCONTINUED | OUTPATIENT
Start: 2021-10-25 | End: 2021-10-26 | Stop reason: HOSPADM

## 2021-10-25 RX ORDER — MELATONIN
1000 DAILY
Status: DISCONTINUED | OUTPATIENT
Start: 2021-10-25 | End: 2021-10-26 | Stop reason: HOSPADM

## 2021-10-25 RX ORDER — HEPARIN SODIUM 5000 [USP'U]/ML
5000 INJECTION, SOLUTION INTRAVENOUS; SUBCUTANEOUS EVERY 8 HOURS SCHEDULED
Status: DISCONTINUED | OUTPATIENT
Start: 2021-10-25 | End: 2021-10-26 | Stop reason: HOSPADM

## 2021-10-25 RX ADMIN — INSULIN LISPRO 6 UNITS: 100 INJECTION, SOLUTION INTRAVENOUS; SUBCUTANEOUS at 05:10

## 2021-10-25 RX ADMIN — LEVOTHYROXINE SODIUM 200 MCG: 100 TABLET ORAL at 09:55

## 2021-10-25 RX ADMIN — INSULIN LISPRO 5 UNITS: 100 INJECTION, SOLUTION INTRAVENOUS; SUBCUTANEOUS at 07:28

## 2021-10-25 RX ADMIN — INSULIN LISPRO 3 UNITS: 100 INJECTION, SOLUTION INTRAVENOUS; SUBCUTANEOUS at 12:54

## 2021-10-25 RX ADMIN — HEPARIN SODIUM 5000 UNITS: 5000 INJECTION INTRAVENOUS; SUBCUTANEOUS at 20:28

## 2021-10-25 RX ADMIN — SODIUM CHLORIDE 1000 ML: 0.9 INJECTION, SOLUTION INTRAVENOUS at 02:28

## 2021-10-25 RX ADMIN — Medication 1000 UNITS: at 09:56

## 2021-10-25 RX ADMIN — INSULIN HUMAN 10 UNITS: 100 INJECTION, SOLUTION PARENTERAL at 03:01

## 2021-10-25 RX ADMIN — DOCUSATE SODIUM 100 MG: 100 CAPSULE, LIQUID FILLED ORAL at 20:28

## 2021-10-25 RX ADMIN — DOCUSATE SODIUM 100 MG: 100 CAPSULE, LIQUID FILLED ORAL at 09:56

## 2021-10-25 RX ADMIN — HEPARIN SODIUM 5000 UNITS: 5000 INJECTION INTRAVENOUS; SUBCUTANEOUS at 09:56

## 2021-10-25 RX ADMIN — INSULIN LISPRO 8 UNITS: 100 INJECTION, SOLUTION INTRAVENOUS; SUBCUTANEOUS at 20:30

## 2021-10-25 RX ADMIN — INSULIN LISPRO 8 UNITS: 100 INJECTION, SOLUTION INTRAVENOUS; SUBCUTANEOUS at 10:29

## 2021-10-25 RX ADMIN — METOPROLOL SUCCINATE 50 MG: 50 TABLET, EXTENDED RELEASE ORAL at 20:23

## 2021-10-25 RX ADMIN — SODIUM CHLORIDE, SODIUM LACTATE, POTASSIUM CHLORIDE, AND CALCIUM CHLORIDE 75 ML/HR: .6; .31; .03; .02 INJECTION, SOLUTION INTRAVENOUS at 12:49

## 2021-10-25 RX ADMIN — INSULIN GLARGINE 24 UNITS: 100 INJECTION, SOLUTION SUBCUTANEOUS at 20:30

## 2021-10-25 RX ADMIN — SODIUM CHLORIDE 1000 ML: 0.9 INJECTION, SOLUTION INTRAVENOUS at 03:43

## 2021-10-25 RX ADMIN — METOPROLOL SUCCINATE 50 MG: 50 TABLET, EXTENDED RELEASE ORAL at 09:56

## 2021-10-26 VITALS
WEIGHT: 235 LBS | HEIGHT: 62 IN | SYSTOLIC BLOOD PRESSURE: 98 MMHG | DIASTOLIC BLOOD PRESSURE: 55 MMHG | RESPIRATION RATE: 16 BRPM | OXYGEN SATURATION: 95 % | BODY MASS INDEX: 43.24 KG/M2 | HEART RATE: 84 BPM | TEMPERATURE: 98 F

## 2021-10-26 LAB
ALBUMIN SERPL BCP-MCNC: 3.1 G/DL (ref 3.5–5)
ALP SERPL-CCNC: 64 U/L (ref 46–116)
ALT SERPL W P-5'-P-CCNC: 12 U/L (ref 12–78)
ANION GAP SERPL CALCULATED.3IONS-SCNC: 12 MMOL/L (ref 4–13)
AST SERPL W P-5'-P-CCNC: 21 U/L (ref 5–45)
BASOPHILS # BLD AUTO: 0.03 THOUSANDS/ΜL (ref 0–0.1)
BASOPHILS NFR BLD AUTO: 0 % (ref 0–1)
BILIRUB SERPL-MCNC: 0.99 MG/DL (ref 0.2–1)
BUN SERPL-MCNC: 45 MG/DL (ref 5–25)
CALCIUM ALBUM COR SERPL-MCNC: 10.2 MG/DL (ref 8.3–10.1)
CALCIUM SERPL-MCNC: 9.5 MG/DL (ref 8.3–10.1)
CHLORIDE SERPL-SCNC: 96 MMOL/L (ref 100–108)
CO2 SERPL-SCNC: 26 MMOL/L (ref 21–32)
CREAT SERPL-MCNC: 1.4 MG/DL (ref 0.6–1.3)
EOSINOPHIL # BLD AUTO: 0.13 THOUSAND/ΜL (ref 0–0.61)
EOSINOPHIL NFR BLD AUTO: 2 % (ref 0–6)
ERYTHROCYTE [DISTWIDTH] IN BLOOD BY AUTOMATED COUNT: 13.2 % (ref 11.6–15.1)
GFR SERPL CREATININE-BSD FRML MDRD: 38 ML/MIN/1.73SQ M
GLUCOSE P FAST SERPL-MCNC: 391 MG/DL (ref 65–99)
GLUCOSE SERPL-MCNC: 391 MG/DL (ref 65–140)
GLUCOSE SERPL-MCNC: 407 MG/DL (ref 65–140)
HCT VFR BLD AUTO: 45.9 % (ref 34.8–46.1)
HGB BLD-MCNC: 15.4 G/DL (ref 11.5–15.4)
IMM GRANULOCYTES # BLD AUTO: 0.12 THOUSAND/UL (ref 0–0.2)
IMM GRANULOCYTES NFR BLD AUTO: 1 % (ref 0–2)
LYMPHOCYTES # BLD AUTO: 1.89 THOUSANDS/ΜL (ref 0.6–4.47)
LYMPHOCYTES NFR BLD AUTO: 23 % (ref 14–44)
MCH RBC QN AUTO: 29.6 PG (ref 26.8–34.3)
MCHC RBC AUTO-ENTMCNC: 33.6 G/DL (ref 31.4–37.4)
MCV RBC AUTO: 88 FL (ref 82–98)
MONOCYTES # BLD AUTO: 0.58 THOUSAND/ΜL (ref 0.17–1.22)
MONOCYTES NFR BLD AUTO: 7 % (ref 4–12)
NEUTROPHILS # BLD AUTO: 5.53 THOUSANDS/ΜL (ref 1.85–7.62)
NEUTS SEG NFR BLD AUTO: 67 % (ref 43–75)
NRBC BLD AUTO-RTO: 0 /100 WBCS
PLATELET # BLD AUTO: 181 THOUSANDS/UL (ref 149–390)
PMV BLD AUTO: 11 FL (ref 8.9–12.7)
POTASSIUM SERPL-SCNC: 3.8 MMOL/L (ref 3.5–5.3)
PROT SERPL-MCNC: 6.9 G/DL (ref 6.4–8.2)
RBC # BLD AUTO: 5.21 MILLION/UL (ref 3.81–5.12)
SODIUM SERPL-SCNC: 134 MMOL/L (ref 136–145)
WBC # BLD AUTO: 8.28 THOUSAND/UL (ref 4.31–10.16)

## 2021-10-26 PROCEDURE — 85025 COMPLETE CBC W/AUTO DIFF WBC: CPT | Performed by: INTERNAL MEDICINE

## 2021-10-26 PROCEDURE — 94761 N-INVAS EAR/PLS OXIMETRY MLT: CPT

## 2021-10-26 PROCEDURE — 80053 COMPREHEN METABOLIC PANEL: CPT | Performed by: INTERNAL MEDICINE

## 2021-10-26 PROCEDURE — 36415 COLL VENOUS BLD VENIPUNCTURE: CPT | Performed by: INTERNAL MEDICINE

## 2021-10-26 PROCEDURE — 97166 OT EVAL MOD COMPLEX 45 MIN: CPT

## 2021-10-26 PROCEDURE — 97163 PT EVAL HIGH COMPLEX 45 MIN: CPT

## 2021-10-26 PROCEDURE — 82948 REAGENT STRIP/BLOOD GLUCOSE: CPT

## 2021-10-26 PROCEDURE — 99217 PR OBSERVATION CARE DISCHARGE MANAGEMENT: CPT | Performed by: INTERNAL MEDICINE

## 2021-10-26 RX ORDER — INSULIN GLARGINE 300 U/ML
30 INJECTION, SOLUTION SUBCUTANEOUS
Qty: 4.5 ML | Refills: 0 | Status: SHIPPED | OUTPATIENT
Start: 2021-10-26

## 2021-10-26 RX ORDER — INSULIN GLARGINE 100 [IU]/ML
30 INJECTION, SOLUTION SUBCUTANEOUS EVERY MORNING
Status: DISCONTINUED | OUTPATIENT
Start: 2021-10-27 | End: 2021-10-26 | Stop reason: HOSPADM

## 2021-10-26 RX ORDER — INSULIN GLARGINE 100 [IU]/ML
10 INJECTION, SOLUTION SUBCUTANEOUS ONCE
Status: DISCONTINUED | OUTPATIENT
Start: 2021-10-26 | End: 2021-10-26 | Stop reason: HOSPADM

## 2021-10-26 RX ADMIN — INSULIN GLARGINE 24 UNITS: 100 INJECTION, SOLUTION SUBCUTANEOUS at 09:21

## 2021-10-26 RX ADMIN — HEPARIN SODIUM 5000 UNITS: 5000 INJECTION INTRAVENOUS; SUBCUTANEOUS at 06:52

## 2021-10-26 RX ADMIN — Medication 1000 UNITS: at 09:21

## 2021-10-26 RX ADMIN — INSULIN LISPRO 8 UNITS: 100 INJECTION, SOLUTION INTRAVENOUS; SUBCUTANEOUS at 09:21

## 2021-10-26 RX ADMIN — DOCUSATE SODIUM 100 MG: 100 CAPSULE, LIQUID FILLED ORAL at 09:21

## 2021-10-26 RX ADMIN — METOPROLOL SUCCINATE 50 MG: 50 TABLET, EXTENDED RELEASE ORAL at 09:21

## 2021-10-26 RX ADMIN — LEVOTHYROXINE SODIUM 200 MCG: 100 TABLET ORAL at 06:51

## 2021-12-21 ENCOUNTER — IN-CLINIC DEVICE VISIT (OUTPATIENT)
Dept: CARDIOLOGY CLINIC | Facility: CLINIC | Age: 71
End: 2021-12-21
Payer: MEDICARE

## 2021-12-21 DIAGNOSIS — Z45.010 ENCOUNTER FOR CHECKING AND TESTING OF CARDIAC PACEMAKER PULSE GENERATOR (BATTERY): ICD-10-CM

## 2021-12-21 DIAGNOSIS — I49.5 SICK SINUS SYNDROME (HCC): Primary | ICD-10-CM

## 2021-12-21 PROCEDURE — 93279 PRGRMG DEV EVAL PM/LDLS PM: CPT | Performed by: INTERNAL MEDICINE

## 2022-01-04 NOTE — PROGRESS NOTES
Device check in person pacer  Changed atrial pacing to unipolar to decrease output  No episodes  Normal battery function        Current Outpatient Medications:     cholecalciferol (VITAMIN D3) 1,000 units tablet, Take 1,000 Units by mouth daily, Disp: , Rfl:     glipiZIDE (GLUCOTROL) 5 mg tablet, TAKE 1 TABLET BY MOUTH TWICE A DAY  BEFORE BREAKFAST AND SUPPER (2 TABLETS TOTAL DAILY), Disp: , Rfl:     hydrochlorothiazide (HYDRODIURIL) 25 mg tablet, Take 25 mg by mouth daily, Disp: , Rfl:     insulin aspart (NovoLOG) 100 units/mL injection, Inject 8 Units under the skin 3 (three) times a day before meals, Disp: 10 mL, Rfl: 0    insulin glargine (Toujeo SoloStar) 300 units/mL CONCENTRATED U-300 injection pen (1-unit dial), Inject 30 Units under the skin daily at bedtime, Disp: 4 5 mL, Rfl: 0    levothyroxine 150 mcg tablet, Take 200 mcg by mouth daily , Disp: , Rfl:     metoprolol succinate (TOPROL XL) 50 mg 24 hr tablet, Take 50 mg by mouth 2 (two) times a day, Disp: , Rfl:

## 2022-04-19 ENCOUNTER — IN-CLINIC DEVICE VISIT (OUTPATIENT)
Dept: CARDIOLOGY CLINIC | Facility: CLINIC | Age: 72
End: 2022-04-19
Payer: MEDICARE

## 2022-04-19 DIAGNOSIS — Z45.010 ENCOUNTER FOR CHECKING AND TESTING OF CARDIAC PACEMAKER PULSE GENERATOR (BATTERY): ICD-10-CM

## 2022-04-19 DIAGNOSIS — I49.5 SICK SINUS SYNDROME (HCC): Primary | ICD-10-CM

## 2022-04-19 PROCEDURE — 93280 PM DEVICE PROGR EVAL DUAL: CPT | Performed by: INTERNAL MEDICINE

## 2022-05-04 NOTE — PROGRESS NOTES
Device check in person pacer  High atrial threshold  Normal battery function        Current Outpatient Medications:     cholecalciferol (VITAMIN D3) 1,000 units tablet, Take 1,000 Units by mouth daily, Disp: , Rfl:     glipiZIDE (GLUCOTROL) 5 mg tablet, TAKE 1 TABLET BY MOUTH TWICE A DAY  BEFORE BREAKFAST AND SUPPER (2 TABLETS TOTAL DAILY), Disp: , Rfl:     hydrochlorothiazide (HYDRODIURIL) 25 mg tablet, Take 25 mg by mouth daily, Disp: , Rfl:     insulin aspart (NovoLOG) 100 units/mL injection, Inject 8 Units under the skin 3 (three) times a day before meals, Disp: 10 mL, Rfl: 0    insulin glargine (Toujeo SoloStar) 300 units/mL CONCENTRATED U-300 injection pen (1-unit dial), Inject 30 Units under the skin daily at bedtime, Disp: 4 5 mL, Rfl: 0    levothyroxine 150 mcg tablet, Take 200 mcg by mouth daily , Disp: , Rfl:     metoprolol succinate (TOPROL XL) 50 mg 24 hr tablet, Take 50 mg by mouth 2 (two) times a day, Disp: , Rfl:

## 2022-07-19 ENCOUNTER — IN-CLINIC DEVICE VISIT (OUTPATIENT)
Dept: CARDIOLOGY CLINIC | Facility: CLINIC | Age: 72
End: 2022-07-19
Payer: MEDICARE

## 2022-07-19 DIAGNOSIS — Z45.010 ENCOUNTER FOR CHECKING AND TESTING OF CARDIAC PACEMAKER PULSE GENERATOR (BATTERY): ICD-10-CM

## 2022-07-19 DIAGNOSIS — I49.5 SICK SINUS SYNDROME (HCC): Primary | ICD-10-CM

## 2022-07-19 PROCEDURE — 93288 INTERROG EVL PM/LDLS PM IP: CPT | Performed by: INTERNAL MEDICINE

## 2022-07-23 NOTE — PROGRESS NOTES
Device check in person pacer  No events  Battery approaching 1 yr on battery        Current Outpatient Medications:     cholecalciferol (VITAMIN D3) 1,000 units tablet, Take 1,000 Units by mouth daily, Disp: , Rfl:     glipiZIDE (GLUCOTROL) 5 mg tablet, TAKE 1 TABLET BY MOUTH TWICE A DAY  BEFORE BREAKFAST AND SUPPER (2 TABLETS TOTAL DAILY), Disp: , Rfl:     hydrochlorothiazide (HYDRODIURIL) 25 mg tablet, Take 25 mg by mouth daily, Disp: , Rfl:     insulin aspart (NovoLOG) 100 units/mL injection, Inject 8 Units under the skin 3 (three) times a day before meals, Disp: 10 mL, Rfl: 0    insulin glargine (Toujeo SoloStar) 300 units/mL CONCENTRATED U-300 injection pen (1-unit dial), Inject 30 Units under the skin daily at bedtime, Disp: 4 5 mL, Rfl: 0    levothyroxine 150 mcg tablet, Take 200 mcg by mouth daily , Disp: , Rfl:     metoprolol succinate (TOPROL XL) 50 mg 24 hr tablet, Take 50 mg by mouth 2 (two) times a day, Disp: , Rfl:

## 2022-08-31 ENCOUNTER — OFFICE VISIT (OUTPATIENT)
Dept: CARDIOLOGY CLINIC | Facility: CLINIC | Age: 72
End: 2022-08-31
Payer: MEDICARE

## 2022-08-31 VITALS
BODY MASS INDEX: 45.64 KG/M2 | HEART RATE: 69 BPM | WEIGHT: 248 LBS | DIASTOLIC BLOOD PRESSURE: 80 MMHG | SYSTOLIC BLOOD PRESSURE: 138 MMHG | HEIGHT: 62 IN

## 2022-08-31 DIAGNOSIS — E66.01 CLASS 3 SEVERE OBESITY DUE TO EXCESS CALORIES WITHOUT SERIOUS COMORBIDITY WITH BODY MASS INDEX (BMI) OF 45.0 TO 49.9 IN ADULT (HCC): ICD-10-CM

## 2022-08-31 DIAGNOSIS — I45.9 HEART BLOCK: ICD-10-CM

## 2022-08-31 DIAGNOSIS — I10 BENIGN ESSENTIAL HTN: ICD-10-CM

## 2022-08-31 DIAGNOSIS — I48.0 PAROXYSMAL ATRIAL FIBRILLATION (HCC): Primary | ICD-10-CM

## 2022-08-31 PROCEDURE — 93000 ELECTROCARDIOGRAM COMPLETE: CPT | Performed by: INTERNAL MEDICINE

## 2022-08-31 PROCEDURE — 99214 OFFICE O/P EST MOD 30 MIN: CPT | Performed by: INTERNAL MEDICINE

## 2022-08-31 RX ORDER — METOPROLOL TARTRATE 50 MG/1
50 TABLET, FILM COATED ORAL 2 TIMES DAILY
COMMUNITY
Start: 2022-06-11

## 2022-08-31 RX ORDER — LEVOTHYROXINE SODIUM 0.2 MG/1
200 TABLET ORAL DAILY
COMMUNITY
Start: 2022-06-11

## 2022-08-31 RX ORDER — INSULIN GLARGINE AND LIXISENATIDE 100; 33 U/ML; UG/ML
INJECTION, SOLUTION SUBCUTANEOUS
COMMUNITY
Start: 2022-08-16

## 2022-08-31 NOTE — PROGRESS NOTES
Patient ID: Enrique Maciel is a 66 y o  female  Plan:      Benign essential HTN  Well controlled  Heart block  Intermittent  Device surveillance continues  Paroxysmal atrial fibrillation (HCC)  Not seen on any device check  Follow up Plan/Other summary comments:  Return in about 1 year (around 8/31/2023)  HPI:  Patient is seen in follow-up regarding the above issues  Since the last visit she has felt reasonably well apart from orthopedic issues  She has a hard time getting up any incline  No falls fortunately  A Medtronic pacemaker was placed in June of 2012  Results for orders placed or performed in visit on 08/31/22   POCT ECG    Impression    Sinus rhythm  Normal          Most recent or relevant cardiac/vascular testing:    Myoview 03/09/2016: Normal          Past Surgical History:   Procedure Laterality Date    A-V CARDIAC PACEMAKER INSERTION  06/2012    dual chamber Medtronic    CARDIAC PACEMAKER PLACEMENT       CMP:   Lab Results   Component Value Date    K 3 8 10/26/2021    CL 96 (L) 10/26/2021    CO2 26 10/26/2021    BUN 45 (H) 10/26/2021    CREATININE 1 40 (H) 10/26/2021    EGFR 38 10/26/2021       Lipid Profile: No results found for: CHOL, TRIG, HDL, LDL      Review of Systems   10  point ROS  was otherwise non pertinent or negative except as per HPI or as below  Gait:  Very slow  Objective:     /80   Pulse 69   Ht 5' 2" (1 575 m)   Wt 112 kg (248 lb)   BMI 45 36 kg/m²     PHYSICAL EXAM:    General:  Normal appearance in no distress  Eyes:  Anicteric  Oral mucosa:  Moist   Neck:  No JVD  Carotid upstrokes are brisk without bruits  No masses  Pacemaker left subclavian region  Chest:  Clear to auscultation  Cardiac:  No palpable PMI  Normal S1 and S2  No murmur gallop or rub  Abdomen:  Soft and nontender  No palpable organomegaly or aortic enlargement  Extremities:  No peripheral edema  Musculoskeletal:  Symmetric     Vascular:  Femoral pulses are brisk without bruits  Popliteal pulses are intact bilaterally  Pedal pulses are intact  Neuro:  Grossly symmetric  Psych:  Alert and oriented x3          Current Outpatient Medications:     cholecalciferol (VITAMIN D3) 1,000 units tablet, Take 1,000 Units by mouth daily, Disp: , Rfl:     glipiZIDE (GLUCOTROL) 5 mg tablet, TAKE 1 TABLET BY MOUTH TWICE A DAY  BEFORE BREAKFAST AND SUPPER (2 TABLETS TOTAL DAILY), Disp: , Rfl:     hydrochlorothiazide (HYDRODIURIL) 25 mg tablet, Take 25 mg by mouth daily, Disp: , Rfl:     levothyroxine 200 mcg tablet, Take 200 mcg by mouth daily, Disp: , Rfl:     metoprolol succinate (TOPROL-XL) 50 mg 24 hr tablet, Take 50 mg by mouth 2 (two) times a day, Disp: , Rfl:     Soliqua 100-33 UNT-MCG/ML injection pen, INJECTS 33 UNITS AT BEDTIME   STOP TOUJEO, Disp: , Rfl:     insulin aspart (NovoLOG) 100 units/mL injection, Inject 8 Units under the skin 3 (three) times a day before meals (Patient not taking: Reported on 8/31/2022), Disp: 10 mL, Rfl: 0    insulin glargine (Toujeo SoloStar) 300 units/mL CONCENTRATED U-300 injection pen (1-unit dial), Inject 30 Units under the skin daily at bedtime (Patient not taking: Reported on 8/31/2022), Disp: 4 5 mL, Rfl: 0    levothyroxine 150 mcg tablet, Take 200 mcg by mouth daily  (Patient not taking: Reported on 8/31/2022), Disp: , Rfl:     metoprolol tartrate (LOPRESSOR) 50 mg tablet, Take 50 mg by mouth 2 (two) times a day (Patient not taking: Reported on 8/31/2022), Disp: , Rfl:   No Known Allergies  Past Medical History:   Diagnosis Date    Atrial fibrillation (HCC)     paroxsysmal    [facility] mellitus ([facility] [address] )     Type 1    History of echocardiogram 08/16/2011    EF 55%, Normal study    History of nuclear stress test 03/09/2016    EF 63%, Normal     Hyperlipidemia     Hypertension     Second degree heart block 2012    s/p dual-chamber Medtronic pacemaker 6/2012           Social History     Tobacco Use   Smoking Status Never Smoker   Smokeless Tobacco Never Used

## 2022-10-18 ENCOUNTER — IN-CLINIC DEVICE VISIT (OUTPATIENT)
Dept: CARDIOLOGY CLINIC | Facility: CLINIC | Age: 72
End: 2022-10-18
Payer: MEDICARE

## 2022-10-18 DIAGNOSIS — I49.5 SICK SINUS SYNDROME (HCC): Primary | ICD-10-CM

## 2022-10-18 DIAGNOSIS — Z45.010 ENCOUNTER FOR CHECKING AND TESTING OF CARDIAC PACEMAKER PULSE GENERATOR (BATTERY): ICD-10-CM

## 2022-10-18 PROCEDURE — 93280 PM DEVICE PROGR EVAL DUAL: CPT | Performed by: INTERNAL MEDICINE

## 2022-10-22 NOTE — PROGRESS NOTES
Device check in person pacer  No events  9 month left of battery   5 modes switches   Afib burden 0 1%      Current Outpatient Medications:   •  cholecalciferol (VITAMIN D3) 1,000 units tablet, Take 1,000 Units by mouth daily, Disp: , Rfl:   •  glipiZIDE (GLUCOTROL) 5 mg tablet, TAKE 1 TABLET BY MOUTH TWICE A DAY  BEFORE BREAKFAST AND SUPPER (2 TABLETS TOTAL DAILY), Disp: , Rfl:   •  hydrochlorothiazide (HYDRODIURIL) 25 mg tablet, Take 25 mg by mouth daily, Disp: , Rfl:   •  insulin aspart (NovoLOG) 100 units/mL injection, Inject 8 Units under the skin 3 (three) times a day before meals (Patient not taking: Reported on 8/31/2022), Disp: 10 mL, Rfl: 0  •  insulin glargine (Toujeo SoloStar) 300 units/mL CONCENTRATED U-300 injection pen (1-unit dial), Inject 30 Units under the skin daily at bedtime (Patient not taking: Reported on 8/31/2022), Disp: 4 5 mL, Rfl: 0  •  levothyroxine 150 mcg tablet, Take 200 mcg by mouth daily  (Patient not taking: Reported on 8/31/2022), Disp: , Rfl:   •  levothyroxine 200 mcg tablet, Take 200 mcg by mouth daily, Disp: , Rfl:   •  metoprolol succinate (TOPROL-XL) 50 mg 24 hr tablet, Take 50 mg by mouth 2 (two) times a day, Disp: , Rfl:   •  metoprolol tartrate (LOPRESSOR) 50 mg tablet, Take 50 mg by mouth 2 (two) times a day (Patient not taking: Reported on 8/31/2022), Disp: , Rfl:   •  Soliqua 100-33 UNT-MCG/ML injection pen, INJECTS 33 UNITS AT BEDTIME   STOP TOUJEO, Disp: , Rfl:       Current Outpatient Medications:   •  cholecalciferol (VITAMIN D3) 1,000 units tablet, Take 1,000 Units by mouth daily, Disp: , Rfl:   •  glipiZIDE (GLUCOTROL) 5 mg tablet, TAKE 1 TABLET BY MOUTH TWICE A DAY  BEFORE BREAKFAST AND SUPPER (2 TABLETS TOTAL DAILY), Disp: , Rfl:   •  hydrochlorothiazide (HYDRODIURIL) 25 mg tablet, Take 25 mg by mouth daily, Disp: , Rfl:   •  insulin aspart (NovoLOG) 100 units/mL injection, Inject 8 Units under the skin 3 (three) times a day before meals (Patient not taking: Reported on 8/31/2022), Disp: 10 mL, Rfl: 0  •  insulin glargine (Toujeo SoloStar) 300 units/mL CONCENTRATED U-300 injection pen (1-unit dial), Inject 30 Units under the skin daily at bedtime (Patient not taking: Reported on 8/31/2022), Disp: 4 5 mL, Rfl: 0  •  levothyroxine 150 mcg tablet, Take 200 mcg by mouth daily  (Patient not taking: Reported on 8/31/2022), Disp: , Rfl:   •  levothyroxine 200 mcg tablet, Take 200 mcg by mouth daily, Disp: , Rfl:   •  metoprolol succinate (TOPROL-XL) 50 mg 24 hr tablet, Take 50 mg by mouth 2 (two) times a day, Disp: , Rfl:   •  metoprolol tartrate (LOPRESSOR) 50 mg tablet, Take 50 mg by mouth 2 (two) times a day (Patient not taking: Reported on 8/31/2022), Disp: , Rfl:   •  Soliqua 100-33 UNT-MCG/ML injection pen, INJECTS 33 UNITS AT BEDTIME   STOP TOUJEO, Disp: , Rfl:

## 2022-12-20 ENCOUNTER — IN-CLINIC DEVICE VISIT (OUTPATIENT)
Dept: CARDIOLOGY CLINIC | Facility: CLINIC | Age: 72
End: 2022-12-20

## 2022-12-20 DIAGNOSIS — I49.5 SICK SINUS SYNDROME (HCC): Primary | ICD-10-CM

## 2022-12-20 DIAGNOSIS — Z45.010 ENCOUNTER FOR CHECKING AND TESTING OF CARDIAC PACEMAKER PULSE GENERATOR (BATTERY): ICD-10-CM

## 2022-12-27 NOTE — PROGRESS NOTES
Device check in person pacer  7 rommel  to [facility]  Will bring back to office device clinic in 3 months  Current Outpatient Medications:   •  cholecalciferol (VITAMIN D3) 1,000 units tablet, Take 1,000 Units by mouth daily, Disp: , Rfl:   •  glipiZIDE (GLUCOTROL) 5 mg tablet, TAKE 1 TABLET BY MOUTH TWICE A DAY  BEFORE BREAKFAST AND SUPPER (2 TABLETS TOTAL DAILY), Disp: , Rfl:   •  hydrochlorothiazide (HYDRODIURIL) 25 mg tablet, Take 25 mg by mouth daily, Disp: , Rfl:   •  insulin aspart (NovoLOG) 100 units/mL injection, Inject 8 Units under the skin 3 (three) times a day before meals (Patient not taking: Reported on 8/31/2022), Disp: 10 mL, Rfl: 0  •  insulin glargine (Toujeo SoloStar) 300 units/mL CONCENTRATED U-300 injection pen (1-unit dial), Inject 30 Units under the skin daily at bedtime (Patient not taking: Reported on 8/31/2022), Disp: 4 5 mL, Rfl: 0  •  levothyroxine 150 mcg tablet, Take 200 mcg by mouth daily  (Patient not taking: Reported on 8/31/2022), Disp: , Rfl:   •  levothyroxine 200 mcg tablet, Take 200 mcg by mouth daily, Disp: , Rfl:   •  metoprolol succinate (TOPROL-XL) 50 mg 24 hr tablet, Take 50 mg by mouth 2 (two) times a day, Disp: , Rfl:   •  metoprolol tartrate (LOPRESSOR) 50 mg tablet, Take 50 mg by mouth 2 (two) times a day (Patient not taking: Reported on 8/31/2022), Disp: , Rfl:   •  Soliqua 100-33 UNT-MCG/ML injection pen, INJECTS 33 UNITS AT BEDTIME   STOP TOUJEO, Disp: , Rfl: Device check in person          Current Outpatient Medications:   •  cholecalciferol (VITAMIN D3) 1,000 units tablet, Take 1,000 Units by mouth daily, Disp: , Rfl:   •  glipiZIDE (GLUCOTROL) 5 mg tablet, TAKE 1 TABLET BY MOUTH TWICE A DAY  BEFORE BREAKFAST AND SUPPER (2 TABLETS TOTAL DAILY), Disp: , Rfl:   •  hydrochlorothiazide (HYDRODIURIL) 25 mg tablet, Take 25 mg by mouth daily, Disp: , Rfl:   •  insulin aspart (NovoLOG) 100 units/mL injection, Inject 8 Units under the skin 3 (three) times a day before meals (Patient not taking: Reported on 8/31/2022), Disp: 10 mL, Rfl: 0  •  insulin glargine (Toujeo SoloStar) 300 units/mL CONCENTRATED U-300 injection pen (1-unit dial), Inject 30 Units under the skin daily at bedtime (Patient not taking: Reported on 8/31/2022), Disp: 4 5 mL, Rfl: 0  •  levothyroxine 150 mcg tablet, Take 200 mcg by mouth daily  (Patient not taking: Reported on 8/31/2022), Disp: , Rfl:   •  levothyroxine 200 mcg tablet, Take 200 mcg by mouth daily, Disp: , Rfl:   •  metoprolol succinate (TOPROL-XL) 50 mg 24 hr tablet, Take 50 mg by mouth 2 (two) times a day, Disp: , Rfl:   •  metoprolol tartrate (LOPRESSOR) 50 mg tablet, Take 50 mg by mouth 2 (two) times a day (Patient not taking: Reported on 8/31/2022), Disp: , Rfl:   •  Soliqua 100-33 UNT-MCG/ML injection pen, INJECTS 33 UNITS AT BEDTIME   STOP TOUJEO, Disp: , Rfl:

## 2023-03-21 ENCOUNTER — IN-CLINIC DEVICE VISIT (OUTPATIENT)
Dept: CARDIOLOGY CLINIC | Facility: CLINIC | Age: 73
End: 2023-03-21

## 2023-03-21 DIAGNOSIS — I49.5 SICK SINUS SYNDROME (HCC): Primary | ICD-10-CM

## 2023-03-21 DIAGNOSIS — Z45.010 ENCOUNTER FOR CHECKING AND TESTING OF CARDIAC PACEMAKER PULSE GENERATOR (BATTERY): ICD-10-CM

## 2023-03-29 NOTE — PROGRESS NOTES
Device check in person pacer  7 months to [facility]  Will being back in 3 mons            Current Outpatient Medications:   •  cholecalciferol (VITAMIN D3) 1,000 units tablet, Take 1,000 Units by mouth daily, Disp: , Rfl:   •  glipiZIDE (GLUCOTROL) 5 mg tablet, TAKE 1 TABLET BY MOUTH TWICE A DAY  BEFORE BREAKFAST AND SUPPER (2 TABLETS TOTAL DAILY), Disp: , Rfl:   •  hydrochlorothiazide (HYDRODIURIL) 25 mg tablet, Take 25 mg by mouth daily, Disp: , Rfl:   •  insulin aspart (NovoLOG) 100 units/mL injection, Inject 8 Units under the skin 3 (three) times a day before meals (Patient not taking: Reported on 8/31/2022), Disp: 10 mL, Rfl: 0  •  insulin glargine (Toujeo SoloStar) 300 units/mL CONCENTRATED U-300 injection pen (1-unit dial), Inject 30 Units under the skin daily at bedtime (Patient not taking: Reported on 8/31/2022), Disp: 4 5 mL, Rfl: 0  •  levothyroxine 150 mcg tablet, Take 200 mcg by mouth daily  (Patient not taking: Reported on 8/31/2022), Disp: , Rfl:   •  levothyroxine 200 mcg tablet, Take 200 mcg by mouth daily, Disp: , Rfl:   •  metoprolol succinate (TOPROL-XL) 50 mg 24 hr tablet, Take 50 mg by mouth 2 (two) times a day, Disp: , Rfl:   •  metoprolol tartrate (LOPRESSOR) 50 mg tablet, Take 50 mg by mouth 2 (two) times a day (Patient not taking: Reported on 8/31/2022), Disp: , Rfl:   •  Soliqua 100-33 UNT-MCG/ML injection pen, INJECTS 33 UNITS AT BEDTIME   STOP TOUJEO, Disp: , Rfl:

## 2023-05-23 ENCOUNTER — IN-CLINIC DEVICE VISIT (OUTPATIENT)
Dept: CARDIOLOGY CLINIC | Facility: CLINIC | Age: 73
End: 2023-05-23

## 2023-05-23 DIAGNOSIS — Z45.010 ENCOUNTER FOR CHECKING AND TESTING OF CARDIAC PACEMAKER PULSE GENERATOR (BATTERY): ICD-10-CM

## 2023-05-23 DIAGNOSIS — I49.5 SICK SINUS SYNDROME (HCC): Primary | ICD-10-CM

## 2023-06-01 NOTE — PROGRESS NOTES
Device check in person pacer  Hx: of PAF  1 mon  left to [facility]  Jordi  Made to re check        Current Outpatient Medications:   •  cholecalciferol (VITAMIN D3) 1,000 units tablet, Take 1,000 Units by mouth daily, Disp: , Rfl:   •  glipiZIDE (GLUCOTROL) 5 mg tablet, TAKE 1 TABLET BY MOUTH TWICE A DAY  BEFORE BREAKFAST AND SUPPER (2 TABLETS TOTAL DAILY), Disp: , Rfl:   •  hydrochlorothiazide (HYDRODIURIL) 25 mg tablet, Take 25 mg by mouth daily, Disp: , Rfl:   •  insulin aspart (NovoLOG) 100 units/mL injection, Inject 8 Units under the skin 3 (three) times a day before meals (Patient not taking: Reported on 8/31/2022), Disp: 10 mL, Rfl: 0  •  insulin glargine (Toujeo SoloStar) 300 units/mL CONCENTRATED U-300 injection pen (1-unit dial), Inject 30 Units under the skin daily at bedtime (Patient not taking: Reported on 8/31/2022), Disp: 4 5 mL, Rfl: 0  •  levothyroxine 150 mcg tablet, Take 200 mcg by mouth daily  (Patient not taking: Reported on 8/31/2022), Disp: , Rfl:   •  levothyroxine 200 mcg tablet, Take 200 mcg by mouth daily, Disp: , Rfl:   •  metoprolol succinate (TOPROL-XL) 50 mg 24 hr tablet, Take 50 mg by mouth 2 (two) times a day, Disp: , Rfl:   •  metoprolol tartrate (LOPRESSOR) 50 mg tablet, Take 50 mg by mouth 2 (two) times a day (Patient not taking: Reported on 8/31/2022), Disp: , Rfl:   •  Soliqua 100-33 UNT-MCG/ML injection pen, INJECTS 33 UNITS AT BEDTIME   STOP TOUJEO, Disp: , Rfl:

## 2023-06-20 ENCOUNTER — IN-CLINIC DEVICE VISIT (OUTPATIENT)
Dept: CARDIOLOGY CLINIC | Facility: CLINIC | Age: 73
End: 2023-06-20
Payer: MEDICARE

## 2023-06-20 DIAGNOSIS — I49.5 SICK SINUS SYNDROME (HCC): Primary | ICD-10-CM

## 2023-06-20 DIAGNOSIS — Z45.010 ENCOUNTER FOR CHECKING AND TESTING OF CARDIAC PACEMAKER PULSE GENERATOR (BATTERY): ICD-10-CM

## 2023-06-20 PROCEDURE — 93280 PM DEVICE PROGR EVAL DUAL: CPT | Performed by: INTERNAL MEDICINE

## 2023-06-29 NOTE — PROGRESS NOTES
Device check in person pacer  HX: of PAF  Close to [facility] will be checking monthly          Current Outpatient Medications:   •  cholecalciferol (VITAMIN D3) 1,000 units tablet, Take 1,000 Units by mouth daily, Disp: , Rfl:   •  glipiZIDE (GLUCOTROL) 5 mg tablet, TAKE 1 TABLET BY MOUTH TWICE A DAY  BEFORE BREAKFAST AND SUPPER (2 TABLETS TOTAL DAILY), Disp: , Rfl:   •  hydrochlorothiazide (HYDRODIURIL) 25 mg tablet, Take 25 mg by mouth daily, Disp: , Rfl:   •  insulin aspart (NovoLOG) 100 units/mL injection, Inject 8 Units under the skin 3 (three) times a day before meals (Patient not taking: Reported on 8/31/2022), Disp: 10 mL, Rfl: 0  •  insulin glargine (Toujeo SoloStar) 300 units/mL CONCENTRATED U-300 injection pen (1-unit dial), Inject 30 Units under the skin daily at bedtime (Patient not taking: Reported on 8/31/2022), Disp: 4 5 mL, Rfl: 0  •  levothyroxine 150 mcg tablet, Take 200 mcg by mouth daily  (Patient not taking: Reported on 8/31/2022), Disp: , Rfl:   •  levothyroxine 200 mcg tablet, Take 200 mcg by mouth daily, Disp: , Rfl:   •  metoprolol succinate (TOPROL-XL) 50 mg 24 hr tablet, Take 50 mg by mouth 2 (two) times a day, Disp: , Rfl:   •  metoprolol tartrate (LOPRESSOR) 50 mg tablet, Take 50 mg by mouth 2 (two) times a day (Patient not taking: Reported on 8/31/2022), Disp: , Rfl:   •  Soliqua 100-33 UNT-MCG/ML injection pen, INJECTS 33 UNITS AT BEDTIME   STOP TOUJEO, Disp: , Rfl:

## 2023-07-06 ENCOUNTER — OFFICE VISIT (OUTPATIENT)
Dept: URGENT CARE | Facility: CLINIC | Age: 73
End: 2023-07-06
Payer: MEDICARE

## 2023-07-06 ENCOUNTER — APPOINTMENT (EMERGENCY)
Dept: NON INVASIVE DIAGNOSTICS | Facility: HOSPITAL | Age: 73
End: 2023-07-06
Payer: MEDICARE

## 2023-07-06 ENCOUNTER — HOSPITAL ENCOUNTER (EMERGENCY)
Facility: HOSPITAL | Age: 73
Discharge: HOME | End: 2023-07-06
Attending: EMERGENCY MEDICINE | Admitting: EMERGENCY MEDICINE
Payer: MEDICARE

## 2023-07-06 VITALS
DIASTOLIC BLOOD PRESSURE: 74 MMHG | HEART RATE: 74 BPM | OXYGEN SATURATION: 95 % | SYSTOLIC BLOOD PRESSURE: 172 MMHG | RESPIRATION RATE: 18 BRPM | TEMPERATURE: 98 F

## 2023-07-06 VITALS
DIASTOLIC BLOOD PRESSURE: 84 MMHG | HEART RATE: 66 BPM | RESPIRATION RATE: 19 BRPM | OXYGEN SATURATION: 100 % | SYSTOLIC BLOOD PRESSURE: 201 MMHG | TEMPERATURE: 98.3 F

## 2023-07-06 DIAGNOSIS — R60.0 BILATERAL LEG EDEMA: ICD-10-CM

## 2023-07-06 DIAGNOSIS — Z86.718 HISTORY OF DVT (DEEP VEIN THROMBOSIS): ICD-10-CM

## 2023-07-06 DIAGNOSIS — L03.90 CELLULITIS: ICD-10-CM

## 2023-07-06 DIAGNOSIS — I82.409 DVT (DEEP VENOUS THROMBOSIS) (HCC): Primary | ICD-10-CM

## 2023-07-06 DIAGNOSIS — L03.115 CELLULITIS OF RIGHT ANTERIOR LOWER LEG: Primary | ICD-10-CM

## 2023-07-06 LAB
ALBUMIN SERPL BCP-MCNC: 3.8 G/DL (ref 3.5–5)
ALP SERPL-CCNC: 58 U/L (ref 34–104)
ALT SERPL W P-5'-P-CCNC: 6 U/L (ref 7–52)
ANION GAP SERPL CALCULATED.3IONS-SCNC: 16 MMOL/L
APTT PPP: 28 SECONDS (ref 23–37)
AST SERPL W P-5'-P-CCNC: 18 U/L (ref 13–39)
BASOPHILS # BLD AUTO: 0.04 THOUSANDS/ÂΜL (ref 0–0.1)
BASOPHILS NFR BLD AUTO: 1 % (ref 0–1)
BILIRUB SERPL-MCNC: 0.64 MG/DL (ref 0.2–1)
BNP SERPL-MCNC: 50 PG/ML (ref 0–100)
BUN SERPL-MCNC: 15 MG/DL (ref 5–25)
CALCIUM SERPL-MCNC: 9.2 MG/DL (ref 8.4–10.2)
CHLORIDE SERPL-SCNC: 100 MMOL/L (ref 96–108)
CO2 SERPL-SCNC: 23 MMOL/L (ref 21–32)
CREAT SERPL-MCNC: 0.83 MG/DL (ref 0.6–1.3)
EOSINOPHIL # BLD AUTO: 0.22 THOUSAND/ÂΜL (ref 0–0.61)
EOSINOPHIL NFR BLD AUTO: 3 % (ref 0–6)
ERYTHROCYTE [DISTWIDTH] IN BLOOD BY AUTOMATED COUNT: 13.6 % (ref 11.6–15.1)
GFR SERPL CREATININE-BSD FRML MDRD: 70 ML/MIN/1.73SQ M
GLUCOSE SERPL-MCNC: 172 MG/DL (ref 65–140)
HCT VFR BLD AUTO: 44.5 % (ref 34.8–46.1)
HGB BLD-MCNC: 14.2 G/DL (ref 11.5–15.4)
IMM GRANULOCYTES # BLD AUTO: 0.02 THOUSAND/UL (ref 0–0.2)
IMM GRANULOCYTES NFR BLD AUTO: 0 % (ref 0–2)
INR PPP: 0.98 (ref 0.84–1.19)
LYMPHOCYTES # BLD AUTO: 2.45 THOUSANDS/ÂΜL (ref 0.6–4.47)
LYMPHOCYTES NFR BLD AUTO: 28 % (ref 14–44)
MCH RBC QN AUTO: 30.3 PG (ref 26.8–34.3)
MCHC RBC AUTO-ENTMCNC: 31.9 G/DL (ref 31.4–37.4)
MCV RBC AUTO: 95 FL (ref 82–98)
MONOCYTES # BLD AUTO: 0.58 THOUSAND/ÂΜL (ref 0.17–1.22)
MONOCYTES NFR BLD AUTO: 7 % (ref 4–12)
NEUTROPHILS # BLD AUTO: 5.4 THOUSANDS/ÂΜL (ref 1.85–7.62)
NEUTS SEG NFR BLD AUTO: 61 % (ref 43–75)
NRBC BLD AUTO-RTO: 0 /100 WBCS
PLATELET # BLD AUTO: 203 THOUSANDS/UL (ref 149–390)
PMV BLD AUTO: 10.3 FL (ref 8.9–12.7)
POTASSIUM SERPL-SCNC: 3.5 MMOL/L (ref 3.5–5.3)
PROT SERPL-MCNC: 6.7 G/DL (ref 6.4–8.4)
PROTHROMBIN TIME: 13 SECONDS (ref 11.6–14.5)
RBC # BLD AUTO: 4.68 MILLION/UL (ref 3.81–5.12)
SODIUM SERPL-SCNC: 139 MMOL/L (ref 135–147)
WBC # BLD AUTO: 8.71 THOUSAND/UL (ref 4.31–10.16)

## 2023-07-06 PROCEDURE — 99215 OFFICE O/P EST HI 40 MIN: CPT | Performed by: NURSE PRACTITIONER

## 2023-07-06 PROCEDURE — 36415 COLL VENOUS BLD VENIPUNCTURE: CPT | Performed by: EMERGENCY MEDICINE

## 2023-07-06 PROCEDURE — 80053 COMPREHEN METABOLIC PANEL: CPT | Performed by: EMERGENCY MEDICINE

## 2023-07-06 PROCEDURE — 83880 ASSAY OF NATRIURETIC PEPTIDE: CPT | Performed by: EMERGENCY MEDICINE

## 2023-07-06 PROCEDURE — 93970 EXTREMITY STUDY: CPT

## 2023-07-06 PROCEDURE — 85025 COMPLETE CBC W/AUTO DIFF WBC: CPT | Performed by: EMERGENCY MEDICINE

## 2023-07-06 PROCEDURE — 85610 PROTHROMBIN TIME: CPT | Performed by: EMERGENCY MEDICINE

## 2023-07-06 PROCEDURE — 85730 THROMBOPLASTIN TIME PARTIAL: CPT | Performed by: EMERGENCY MEDICINE

## 2023-07-06 PROCEDURE — G0463 HOSPITAL OUTPT CLINIC VISIT: HCPCS | Performed by: NURSE PRACTITIONER

## 2023-07-06 PROCEDURE — 93970 EXTREMITY STUDY: CPT | Performed by: SURGERY

## 2023-07-06 RX ORDER — CEFAZOLIN SODIUM 1 G/50ML
1000 SOLUTION INTRAVENOUS ONCE
Status: DISCONTINUED | OUTPATIENT
Start: 2023-07-06 | End: 2023-07-06

## 2023-07-06 RX ORDER — CEPHALEXIN 250 MG/1
500 CAPSULE ORAL EVERY 6 HOURS SCHEDULED
Qty: 28 CAPSULE | Refills: 0 | Status: SHIPPED | OUTPATIENT
Start: 2023-07-06 | End: 2023-07-07 | Stop reason: SDUPTHER

## 2023-07-06 RX ORDER — CEFAZOLIN SODIUM 2 G/50ML
2000 SOLUTION INTRAVENOUS ONCE
Status: COMPLETED | OUTPATIENT
Start: 2023-07-06 | End: 2023-07-06

## 2023-07-06 RX ADMIN — APIXABAN 10 MG: 5 TABLET, FILM COATED ORAL at 20:46

## 2023-07-06 RX ADMIN — CEFAZOLIN SODIUM 2000 MG: 2 SOLUTION INTRAVENOUS at 19:08

## 2023-07-06 NOTE — ED PROVIDER NOTES
History  Chief Complaint   Patient presents with   • Leg Swelling     Patient comes in with bilateral leg swelling for the past week. Reports redness and heat from the right leg. Denies any pain      27-year-old female presents emergency department complaining of bilateral lower extremity edema that has been ongoing for the last 2 to 3 weeks. Patient also notes increasing pain and redness to the right lower extremity with increasing heat and edema that is greater than the left. Patient denies any excessive work on her feet, and notes that she keeps her leg elevated at all times. The patient denies any chest pain or shortness of breath but is wondering if she needs a "water pill."  Patient notes she has an appointment with her family doctor next week and states that she could not make it that long. Prior to Admission Medications   Prescriptions Last Dose Informant Patient Reported? Taking?    Soliqua 100-33 UNT-MCG/ML injection pen   Yes No   Sig: INJECTS 33 UNITS AT BEDTIME   STOP TOUJEO   cholecalciferol (VITAMIN D3) 1,000 units tablet   Yes No   Sig: Take 1,000 Units by mouth daily   glipiZIDE (GLUCOTROL) 5 mg tablet   Yes No   Sig: TAKE 1 TABLET BY MOUTH TWICE A DAY  BEFORE BREAKFAST AND SUPPER (2 TABLETS TOTAL DAILY)   hydrochlorothiazide (HYDRODIURIL) 25 mg tablet   Yes No   Sig: Take 25 mg by mouth daily   insulin aspart (NovoLOG) 100 units/mL injection   No No   Sig: Inject 8 Units under the skin 3 (three) times a day before meals   Patient not taking: Reported on 8/31/2022   insulin glargine (Toujeo SoloStar) 300 units/mL CONCENTRATED U-300 injection pen (1-unit dial)   No No   Sig: Inject 30 Units under the skin daily at bedtime   Patient not taking: Reported on 8/31/2022   levothyroxine 150 mcg tablet   Yes No   Sig: Take 200 mcg by mouth daily    Patient not taking: Reported on 8/31/2022   levothyroxine 200 mcg tablet   Yes No   Sig: Take 200 mcg by mouth daily   metoprolol succinate (TOPROL-XL) 50 mg 24 hr tablet   Yes No   Sig: Take 50 mg by mouth 2 (two) times a day   metoprolol tartrate (LOPRESSOR) 50 mg tablet   Yes No   Sig: Take 50 mg by mouth 2 (two) times a day   Patient not taking: Reported on 8/31/2022      Facility-Administered Medications: None       Past Medical History:   Diagnosis Date   • Atrial fibrillation ([address])     paroxsysmal   • Diabetes mellitus ([address])     Type 1   • History of echocardiogram 08/16/2011    EF 55%, Normal study   • History of nuclear stress test 03/09/2016    EF 63%, Normal.   • Hyperlipidemia    • Hypertension    • Second degree heart block 2012    s/p dual-chamber Medtronic pacemaker 6/2012       Past Surgical History:   Procedure Laterality Date   • A-V CARDIAC PACEMAKER INSERTION  06/2012    dual chamber Medtronic   • CARDIAC PACEMAKER PLACEMENT         Family History   Problem Relation Age of Onset   • Diabetes Brother      I have reviewed and agree with the history as documented. E-Cigarette/Vaping   • E-Cigarette Use Never User      E-Cigarette/Vaping Substances   • Nicotine No    • THC No    • CBD No    • Flavoring No    • Other No    • Unknown No      Social History     Tobacco Use   • Smoking status: Never   • Smokeless tobacco: Never   Vaping Use   • Vaping Use: Never used   Substance Use Topics   • Alcohol use: No   • Drug use: No       Review of Systems   Constitutional: Negative for chills and fever. HENT: Negative for ear pain and sore throat. Eyes: Negative for pain and visual disturbance. Respiratory: Negative for cough and shortness of breath. Cardiovascular: Negative for chest pain and palpitations. Gastrointestinal: Negative for abdominal pain and vomiting. Genitourinary: Negative for dysuria and hematuria. Musculoskeletal: Negative for arthralgias and back pain. Skin: Negative for color change and rash. Neurological: Negative for seizures and syncope. All other systems reviewed and are negative.       Physical Exam  Physical Exam  Vitals and nursing note reviewed. Constitutional:       General: She is not in acute distress. Appearance: Normal appearance. She is well-developed. HENT:      Head: Normocephalic and atraumatic. Right Ear: External ear normal.      Left Ear: External ear normal.      Nose: Nose normal.      Mouth/Throat:      Mouth: Mucous membranes are moist.   Eyes:      Conjunctiva/sclera: Conjunctivae normal.   Cardiovascular:      Rate and Rhythm: Normal rate and regular rhythm. Pulses: Normal pulses. Heart sounds: Normal heart sounds. No murmur heard. Pulmonary:      Effort: Pulmonary effort is normal. No respiratory distress. Breath sounds: Normal breath sounds. Abdominal:      General: Bowel sounds are normal.      Palpations: Abdomen is soft. Tenderness: There is no abdominal tenderness. Musculoskeletal:         General: Swelling and tenderness present. No deformity. Cervical back: Neck supple. Right lower leg: Edema present. Left lower leg: Edema present. Skin:     General: Skin is warm and dry. Capillary Refill: Capillary refill takes less than 2 seconds. Findings: Erythema and rash present. Comments: Patient with erythema noted to the right pretibial area. +3 lower extremity edema bilaterally. Neurological:      General: No focal deficit present. Mental Status: She is alert and oriented to person, place, and time. Mental status is at baseline.          Vital Signs  ED Triage Vitals   Temperature Pulse Respirations Blood Pressure SpO2   07/06/23 1523 07/06/23 1523 07/06/23 1523 07/06/23 1525 07/06/23 1523   98.3 °F (36.8 °C) 68 18 (!) 176/83 94 %      Temp Source Heart Rate Source Patient Position - Orthostatic VS BP Location FiO2 (%)   07/06/23 1523 07/06/23 1925 07/06/23 1523 07/06/23 1523 --   Temporal Monitor Sitting Left arm       Pain Score       07/06/23 1523       No Pain           Vitals:    07/06/23 1523 07/06/23 1525 07/06/23 1925   BP:  (!) 176/83 (!) 201/84   Pulse: 68  66   Patient Position - Orthostatic VS: Sitting  Sitting         Visual Acuity      ED Medications  Medications   ceFAZolin (ANCEF) IVPB (premix in dextrose) 2,000 mg 50 mL (0 mg Intravenous Stopped 7/6/23 1938)   apixaban (ELIQUIS) tablet 10 mg (10 mg Oral Given 7/6/23 2046)       Diagnostic Studies  Results Reviewed     Procedure Component Value Units Date/Time    Protime-INR [[id number]]  (Normal) Collected: 07/06/23 1659    Lab Status: Final result Specimen: Blood Updated: 07/06/23 1716     Protime 13.0 seconds      INR 0.98    APTT [[id number]]  (Normal) Collected: 07/06/23 1659    Lab Status: Final result Specimen: Blood Updated: 07/06/23 1716     PTT 28 seconds     B-Type Natriuretic Peptide(BNP) [[id number]]  (Normal) Collected: 07/06/23 1637    Lab Status: Final result Specimen: Blood from Hand, Left Updated: 07/06/23 1716     BNP 50 pg/mL     Comprehensive metabolic panel [[id number]]  (Abnormal) Collected: 07/06/23 1637    Lab Status: Final result Specimen: Blood from Hand, Left Updated: 07/06/23 1703     Sodium 139 mmol/L      Potassium 3.5 mmol/L      Chloride 100 mmol/L      CO2 23 mmol/L      ANION GAP 16 mmol/L      BUN 15 mg/dL      Creatinine 0.83 mg/dL      Glucose 172 mg/dL      Calcium 9.2 mg/dL      AST 18 U/L      ALT 6 U/L      Alkaline Phosphatase 58 U/L      Total Protein 6.7 g/dL      Albumin 3.8 g/dL      Total Bilirubin 0.64 mg/dL      eGFR 70 ml/min/1.73sq m     Narrative:      [place] guidelines for Chronic Kidney Disease (CKD):   •  Stage 1 with normal or high GFR (GFR > 90 mL/min/1.73 square meters)  •  Stage 2 Mild CKD (GFR = 60-89 mL/min/1.73 square meters)  •  Stage 3A Moderate CKD (GFR = 45-59 mL/min/1.73 square meters)  •  Stage 3B Moderate CKD (GFR = 30-44 mL/min/1.73 square meters)  •  Stage 4 Severe CKD (GFR = 15-29 mL/min/1.73 square meters)  •  Stage 5 End Stage CKD (GFR <15 mL/min/1.73 square meters)  Note: GFR calculation is accurate only with a steady state creatinine    CBC and differential [[id number]] Collected: 07/06/23 1637    Lab Status: Final result Specimen: Blood from Hand, Left Updated: 07/06/23 1642     WBC 8.71 Thousand/uL      RBC 4.68 Million/uL      Hemoglobin 14.2 g/dL      Hematocrit 44.5 %      MCV 95 fL      MCH 30.3 pg      MCHC 31.9 g/dL      RDW 13.6 %      MPV 10.3 fL      Platelets 541 Thousands/uL      nRBC 0 /100 WBCs      Neutrophils Relative 61 %      Immat GRANS % 0 %      Lymphocytes Relative 28 %      Monocytes Relative 7 %      Eosinophils Relative 3 %      Basophils Relative 1 %      Neutrophils Absolute 5.40 Thousands/µL      Immature Grans Absolute 0.02 Thousand/uL      Lymphocytes Absolute 2.45 Thousands/µL      Monocytes Absolute 0.58 Thousand/µL      Eosinophils Absolute 0.22 Thousand/µL      Basophils Absolute 0.04 Thousands/µL                  VAS lower limb venous duplex study, complete bilateral   Final Result by Ban Agrawal DO (07/06 2056)                 Procedures  ECG 12 Lead Documentation Only    Date/Time: 7/6/2023 5:06 PM    Performed by: Fabian Galvan DO  Authorized by: Fabian Galvan DO    ECG reviewed by me, the ED Provider: yes    Patient location:  ED  Comments:      EKG is a sinus rhythm at 67 beats a minute with a normal axis. There is no definitive acute ST or T wave changes. There is LVH by voltage criteria             ED Course  ED Course as of 07/06/23 2136   Thu Jul 06, 2023   1801 Glucose, Random(!): 172   1847 Positive DVT acute and chronic in right lower extremity, but no evidence of DVT on left side but difficult study                               SBIRT 22yo+    Flowsheet Row Most Recent Value   Initial Alcohol Screen: US AUDIT-C     1. How often do you have a drink containing alcohol? 0 Filed at: 07/06/2023 1522   2. How many drinks containing alcohol do you have on a typical day you are drinking?   0 Filed at: 07/06/2023 1522   3a. Male UNDER 65: How often do you have five or more drinks on one occasion? 0 Filed at: 07/06/2023 1522   3b. FEMALE Any Age, or MALE 65+: How often do you have 4 or more drinks on one occassion? 0 Filed at: 07/06/2023 1522   Audit-C Score 0 Filed at: 07/06/2023 1522   JOHANA: How many times in the past year have you. .. Used an illegal drug or used a prescription medication for non-medical reasons? Never Filed at: 07/06/2023 1522                    Medical Decision Making  76-year-old female presents emergency department complaining of lower extremity edema, right side worse than left with erythema and warmth to the right lower extremity as well. Patient is a diabetic but denies any fever or chills. Patient also denies trauma. Differential diagnosis at this point is CHF, cellulitis, or DVT. Lab work is ordered which shows no definitive acute abnormalities, renal function and BN peptide within normal limits. EKG is also non-acute. Ultrasound done of the bilateral lower extremities which is positive for right lower extremity DVT. Patient has good pulses and is ambulatory. Patient has no history of recent surgery or intracerebral hemorrhage. Patient only placed on antibiotics as well as Eliquis. Patient has had a DVT in the past so there is an increased likelihood of the patient need to be on chronic anticoagulants. We will place an ambulatory referral to vascular surgery. Cellulitis: acute illness or injury     Details: Antibiotics ordered  DVT (deep venous thrombosis) [facility]):     Details: Patient placed on Eliquis  Amount and/or Complexity of Data Reviewed  Labs: ordered. Decision-making details documented in ED Course. Risk  Prescription drug management.           Disposition  Final diagnoses:   DVT (deep venous thrombosis) ([address])   Cellulitis     Time reflects when diagnosis was documented in both MDM as applicable and the Disposition within this note     Time User Action Codes Description Comment    7/6/2023  8:22 PM Brutico, Josefina Legacy Add [O22.30] DVT (deep vein thrombosis) in pregnancy     7/6/2023  8:22 PM Brutico, Josefina Legacy Add [I82.409] DVT (deep venous thrombosis) ([address])     7/6/2023  8:22 PM Brutico, Josefina Legacy Add [L03.90] Cellulitis     7/6/2023  8:22 PM Brutico, Josefina Legacy Modify [I82.409] DVT (deep venous thrombosis) ([address])     7/6/2023  8:22 PM Brutico, Abbie Fossa [O22.30] DVT (deep vein thrombosis) in pregnancy       ED Disposition     ED Disposition   Discharge    Condition   Stable    Date/Time   Thu Jul 6, 2023  8:19 PM    Comment   Nickola Goldberg Beers discharge to home/self care. Follow-up Information    None         Discharge Medication List as of 7/6/2023  8:44 PM      START taking these medications    Details   apixaban (Eliquis) 5 mg Multiple Dosages:Starting Fri 7/7/2023, Until Thu 7/13/2023 at 2359, THEN Starting Fri 7/14/2023, Until Sat 8/5/2023 at 2359Take 2 tablets (10 mg total) by mouth 2 (two) times a day for 7 days, THEN 1 tablet (5 mg total) 2 (two) times a day for 23 day s.  Do not start before July 7, 2023., Normal      cephalexin (KEFLEX) 250 mg capsule Take 2 capsules (500 mg total) by mouth every 6 (six) hours for 7 days, Starting Thu 7/6/2023, Until Thu 7/13/2023, Normal         CONTINUE these medications which have NOT CHANGED    Details   cholecalciferol (VITAMIN D3) 1,000 units tablet Take 1,000 Units by mouth daily, Historical Med      glipiZIDE (GLUCOTROL) 5 mg tablet TAKE 1 TABLET BY MOUTH TWICE A DAY  BEFORE BREAKFAST AND SUPPER (2 TABLETS TOTAL DAILY), Historical Med      hydrochlorothiazide (HYDRODIURIL) 25 mg tablet Take 25 mg by mouth daily, Historical Med      insulin aspart (NovoLOG) 100 units/mL injection Inject 8 Units under the skin 3 (three) times a day before meals, Starting Tue 10/26/2021, Normal      insulin glargine (Toujeo SoloStar) 300 units/mL CONCENTRATED U-300 injection pen (1-unit dial) Inject 30 Units under the skin daily at bedtime, Starting Tue 10/26/2021, Normal      !! levothyroxine 150 mcg tablet Take 200 mcg by mouth daily , Historical Med      !! levothyroxine 200 mcg tablet Take 200 mcg by mouth daily, Starting Sat 6/11/2022, Historical Med      metoprolol succinate (TOPROL-XL) 50 mg 24 hr tablet Take 50 mg by mouth 2 (two) times a day, Starting Tue 4/29/2008, Historical Med      metoprolol tartrate (LOPRESSOR) 50 mg tablet Take 50 mg by mouth 2 (two) times a day, Starting Sat 6/11/2022, Historical Med      Soliqua 100-33 UNT-MCG/ML injection pen INJECTS 33 UNITS AT BEDTIME   STOP TOUJEO, Historical Med       !! - Potential duplicate medications found. Please discuss with provider. No discharge procedures on file.     PDMP Review       Value Time User    PDMP Reviewed  Yes 10/26/2021 12:00 PM Estefani Grimes MD          ED Provider  Electronically Signed by           Angi Bhakta.,   07/06/23 0051

## 2023-07-06 NOTE — ED NOTES
Attempt x2 20g R forearm without success. Patient tolerated well, 2x2 and pressure applied.       Naheed Laws RN  07/06/23 7348

## 2023-07-06 NOTE — PROGRESS NOTES
[facility] Now        NAME: Isabel Edward is a 67 y.o. female  : 1950    MRN: [id number]  DATE: 2023  TIME: 2:56 PM    Assessment and Plan   Cellulitis of right anterior lower leg [L03.115]  1. Cellulitis of right anterior lower leg  Transfer to other facility      2. Bilateral leg edema  Transfer to other facility      3. History of DVT (deep vein thrombosis)  Transfer to other facility            Patient Instructions       Follow up with PCP in 3-5 days. Proceed to  ER if symptoms worsen. You are being sent to the [facility] ED for higher level of care and evaluation due to lower leg edema and redness and hx of DVT. You are to go there now. Follow up with your PCP after the ED visit          Chief Complaint     Chief Complaint   Patient presents with   • Leg Swelling     C/o bilateral lower leg swelling onset "about a week", patient reports "I have an appointment with my family doctor next week but can't wait". Denies fall/trauma. Denies fever. Skin intact to lower extremities. Denies numbness/tingling. Denies hx of lower leg swelling. Steady gait observed to room. History of Present Illness       This is a 67year old female who states that for the 7 days she has had bilateral lower leg edema and now redness of the right shin. She states "it is warm". She denies injury, recent travel. Hx of DVT per pt. She has a pacer 6years old and will soon need battery change. She states she is not on anticoagulations. Denies weight gain, SOB or CP. She states her PCP is not in and not able to get in until next week. Denies fevers, chills, n/v/d. Review of Systems   Review of Systems   Constitutional: Negative. HENT: Negative. Eyes: Negative. Respiratory: Negative. Cardiovascular: Positive for leg swelling. Gastrointestinal: Negative. Endocrine: Negative. Musculoskeletal: Negative. Skin: Positive for color change. Allergic/Immunologic: Negative. Hematological: Negative. Psychiatric/Behavioral: Negative.           Current Medications       Current Outpatient Medications:   •  cholecalciferol (VITAMIN D3) 1,000 units tablet, Take 1,000 Units by mouth daily, Disp: , Rfl:   •  glipiZIDE (GLUCOTROL) 5 mg tablet, TAKE 1 TABLET BY MOUTH TWICE A DAY  BEFORE BREAKFAST AND SUPPER (2 TABLETS TOTAL DAILY), Disp: , Rfl:   •  hydrochlorothiazide (HYDRODIURIL) 25 mg tablet, Take 25 mg by mouth daily, Disp: , Rfl:   •  insulin aspart (NovoLOG) 100 units/mL injection, Inject 8 Units under the skin 3 (three) times a day before meals (Patient not taking: Reported on 8/31/2022), Disp: 10 mL, Rfl: 0  •  insulin glargine (Toujeo SoloStar) 300 units/mL CONCENTRATED U-300 injection pen (1-unit dial), Inject 30 Units under the skin daily at bedtime (Patient not taking: Reported on 8/31/2022), Disp: 4.5 mL, Rfl: 0  •  levothyroxine 150 mcg tablet, Take 200 mcg by mouth daily  (Patient not taking: Reported on 8/31/2022), Disp: , Rfl:   •  levothyroxine 200 mcg tablet, Take 200 mcg by mouth daily, Disp: , Rfl:   •  metoprolol succinate (TOPROL-XL) 50 mg 24 hr tablet, Take 50 mg by mouth 2 (two) times a day, Disp: , Rfl:   •  metoprolol tartrate (LOPRESSOR) 50 mg tablet, Take 50 mg by mouth 2 (two) times a day (Patient not taking: Reported on 8/31/2022), Disp: , Rfl:   •  Soliqua 100-33 UNT-MCG/ML injection pen, INJECTS 33 UNITS AT BEDTIME   STOP TOUJEO, Disp: , Rfl:     Current Allergies     Allergies as of 07/06/2023   • (No Known Allergies)            The following portions of the patient's history were reviewed and updated as appropriate: allergies, current medications, past family history, past medical history, past social history, past surgical history and problem list.     Past Medical History:   Diagnosis Date   • Atrial fibrillation ([address])     paroxsysmal   • Diabetes mellitus ([address])     Type 1   • History of echocardiogram 08/16/2011    EF 55%, Normal study   • History of nuclear stress test 2016    EF 63%, Normal.   • Hyperlipidemia    • Hypertension    • Second degree heart block     s/p dual-chamber Medtronic pacemaker 2012       Past Surgical History:   Procedure Laterality Date   • A-V CARDIAC PACEMAKER INSERTION  2012    dual chamber Medtronic   • CARDIAC PACEMAKER PLACEMENT         Family History   Problem Relation Age of Onset   • Diabetes Brother          Medications have been verified. Objective   BP (!) 172/74 (BP Location: Left arm, Patient Position: Sitting)   Pulse 74   Temp 98 °F (36.7 °C) (Temporal)   Resp 18   SpO2 95%   No LMP recorded. Patient is postmenopausal.       Physical Exam     Physical Exam  Vitals and nursing note reviewed. Constitutional:       General: She is not in acute distress. Appearance: Normal appearance. She is obese. She is not ill-appearing, toxic-appearing or diaphoretic. HENT:      Head: Normocephalic and atraumatic. Nose: Nose normal.      Mouth/Throat:      Mouth: Mucous membranes are moist.   Eyes:      Extraocular Movements: Extraocular movements intact. Cardiovascular:      Rate and Rhythm: Normal rate. Rhythm irregular. Pulses: Normal pulses. Heart sounds: Normal heart sounds. Pulmonary:      Effort: Pulmonary effort is normal.      Breath sounds: Normal breath sounds. Musculoskeletal:         General: Normal range of motion. Cervical back: Normal range of motion. Right lower le+ Pitting Edema present. Left lower le+ Pitting Edema present. Skin:     General: Skin is warm. Capillary Refill: Capillary refill takes less than 2 seconds. Findings: Erythema present. Neurological:      General: No focal deficit present. Mental Status: She is alert and oriented to person, place, and time. Psychiatric:         Mood and Affect: Mood normal.         Behavior: Behavior normal.         Thought Content:  Thought content normal.         Judgment: Judgment normal.         DDX:  Cellulitis RLE, DVT, cardiac involvement for B/L LE, CHF. Most likely cellulitis however due to Afib hx, hx DVT pt sent to ED for higher eval and tx. Pt agrees with poc.

## 2023-07-06 NOTE — PATIENT INSTRUCTIONS
You are being sent to the [facility] ED for higher level of care and evaluation due to lower leg edema and redness and hx of DVT. You are to go there now.   Follow up with your PCP after the ED visit

## 2023-07-06 NOTE — QUICK NOTE
Seen at the request of ER attending for concerns of an acute right lower extremity DVT possible overlying cellulitis. Spoke with the patient extensively and she reports that she had a previous history of DVT some years ago had been on anticoagulation for 6 months and was subsequently stopped. Patient states that her aunt also had a history of DVT she has had no hypercoagulable work-up in the past.    There was some concern as patient has overlying erythema warmth right lower extremity mostly pretibial and that she is a diabetic with concern for possible superimposed cellulitis. Review of labs and vital signs reveal that she is afebrile with no leukocytosis. On physical exam she does have a warm red right lower extremity with no open wounds noted but fullness right lower extremity with calf tenderness palpation and Homans' sign positive. Patient does not wish to be admitted to the hospital and after long conversation with her I felt that it was reasonable to do a trial of outpatient antibiotics and resume her oral anticoagulation. Strict return precautions were given and that if he were to develop a fever, symptoms were to worsen or not show improvement over the next day or 2 she was to return to the ER for further evaluation. Initially discussed with her possibility of genetic work-up given her family history this could be addressed with her primary care provider versus hematology as an outpatient.

## 2023-07-07 RX ORDER — RIVAROXABAN 15 MG-20MG
KIT ORAL
Qty: 51 EACH | Refills: 0 | Status: SHIPPED | OUTPATIENT
Start: 2023-07-07

## 2023-07-07 RX ORDER — CEPHALEXIN 250 MG/1
500 CAPSULE ORAL EVERY 6 HOURS SCHEDULED
Qty: 56 CAPSULE | Refills: 0 | Status: SHIPPED | OUTPATIENT
Start: 2023-07-07 | End: 2023-07-14

## 2023-07-07 NOTE — ED PROCEDURE NOTE
Patient returned to ED to discuss her medication regimen. Patient reports she cannot afford Eliquis, stating around $200 per month. Patient reports was previously on Elqiuis and used a coupon at that time. Patient reports has Medicare Part D. Patient states lives with her daughter secondary to cost of living. Patient changed to Xarelto. Attained a coupon for Xarelto from care manager onsite. Discussed with pharmacist that patient can start Xarelto today. Reviewed coupon with patient. Reviewed, and placed vascular, and hematology oncology referrals for follow-up, with phone numbers on discharge instructions. Instructed patient to call for today. Discussed with patient that coupon is for 30 days, however there is a phone number for financial assistance that she should call. Discussed with patient that if she should need to change due to financial cost after 30-day trial, that her hematology/oncology for vascular provider can address this at that time. Patient in agreement with treatment plan. Additionally patient's Keflex prescription was for 7 days but only had enough pills for 3 days, updated prescription to reflect 7-day course. Patient is appreciative of discussion, and reports will follow-up with hematology oncology and vascular.                YAQUELIN Arriaga  07/07/23 [id number]

## 2023-07-07 NOTE — DISCHARGE INSTRUCTIONS
Take Eliquis 10 mg 2 times a day for a week and then changed to 5 mg 2 times a day for the remainder of the course. You need to be closely followed by your family doctor as well as vascular surgery. Our schedulers will contact you to set up an appointment with vascular surgery for follow-up. Do not engage in activities that increase your risk for hurting yourself because bleeding may be difficult to stop and at times could be fatal if you hit your head. Keep legs elevated is much as possible over the next week or 2. Your doctor should check you for causes of chronic blood clots as there may be a genetic component.

## 2023-07-17 ENCOUNTER — CONSULT (OUTPATIENT)
Dept: HEMATOLOGY ONCOLOGY | Facility: CLINIC | Age: 73
End: 2023-07-17
Payer: MEDICARE

## 2023-07-17 VITALS
OXYGEN SATURATION: 97 % | SYSTOLIC BLOOD PRESSURE: 122 MMHG | HEIGHT: 62 IN | BODY MASS INDEX: 46.1 KG/M2 | HEART RATE: 50 BPM | WEIGHT: 250.5 LBS | TEMPERATURE: 97.9 F | RESPIRATION RATE: 18 BRPM | DIASTOLIC BLOOD PRESSURE: 80 MMHG

## 2023-07-17 DIAGNOSIS — E11.29 DM TYPE 2 CAUSING RENAL DISEASE, NOT AT GOAL (HCC): Primary | ICD-10-CM

## 2023-07-17 DIAGNOSIS — I82.409 DVT (DEEP VENOUS THROMBOSIS) (HCC): ICD-10-CM

## 2023-07-17 PROBLEM — I82.4Y9 ACUTE DEEP VEIN THROMBOSIS (DVT) OF PROXIMAL VEIN OF LOWER EXTREMITY (HCC): Status: ACTIVE | Noted: 2023-07-17

## 2023-07-17 PROCEDURE — 99204 OFFICE O/P NEW MOD 45 MIN: CPT | Performed by: INTERNAL MEDICINE

## 2023-07-17 RX ORDER — ATORVASTATIN CALCIUM 10 MG/1
TABLET, FILM COATED ORAL
COMMUNITY
Start: 2023-06-01

## 2023-07-17 RX ORDER — TRAMADOL HYDROCHLORIDE 50 MG/1
TABLET ORAL
COMMUNITY
Start: 2023-06-27
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
## 2023-07-17 NOTE — ASSESSMENT & PLAN NOTE
76yo female with PMH of HTN, Afib, obesity, DM, 2nd degree heart block, HLD, RLE DVT. She was recently seen in the ED 7/6 with 1 week history of BLE swelling with erythema and warmth of RLE, found to have an acute DVT in right CFV, popliteal and gastroc veins. She was started on xarelto in ED. Thrombosis panel not obtained in ED. She presents today for consultation. LEV 7/6/23  -Non occlusive acute DVT noted in the common femoral, popliteal, and the paired gastrocnemius veins. Plan-   -Acute RLE DVT, likely unprovoked. 1 prior episode in RLE 2 years ago. -Seen by hematology 7/17. Recommending to continue xarelto 15 mg BID for 21 days and then 20 mg daily for 6 months. She will continue on xarelto 10 mg after that. -Anticoagulation per hematology. -Recommend conservative measures with use of daily compression hose (20-30 mmHg), lower extremity elevation, regular exercise, and diligent skin care. -Rx for graded compression stockings provided today.   -Tubigrip size F donned in office.   -Follow up prn.   - Instructed to call the office in the interim with any questions, concerns, or new symptoms.

## 2023-07-18 ENCOUNTER — CONSULT (OUTPATIENT)
Dept: VASCULAR SURGERY | Facility: CLINIC | Age: 73
End: 2023-07-18
Payer: MEDICARE

## 2023-07-18 ENCOUNTER — IN-CLINIC DEVICE VISIT (OUTPATIENT)
Dept: CARDIOLOGY CLINIC | Facility: CLINIC | Age: 73
End: 2023-07-18
Payer: MEDICARE

## 2023-07-18 VITALS
HEART RATE: 84 BPM | SYSTOLIC BLOOD PRESSURE: 122 MMHG | DIASTOLIC BLOOD PRESSURE: 74 MMHG | OXYGEN SATURATION: 93 % | TEMPERATURE: 97.3 F | HEIGHT: 62 IN | WEIGHT: 251.8 LBS | BODY MASS INDEX: 46.33 KG/M2

## 2023-07-18 DIAGNOSIS — I49.5 SICK SINUS SYNDROME (HCC): Primary | ICD-10-CM

## 2023-07-18 DIAGNOSIS — I82.4Y1 ACUTE DEEP VEIN THROMBOSIS (DVT) OF PROXIMAL VEIN OF RIGHT LOWER EXTREMITY (HCC): Primary | ICD-10-CM

## 2023-07-18 DIAGNOSIS — Z45.010 ENCOUNTER FOR CHECKING AND TESTING OF CARDIAC PACEMAKER PULSE GENERATOR (BATTERY): ICD-10-CM

## 2023-07-18 DIAGNOSIS — I82.409 DVT (DEEP VENOUS THROMBOSIS) (HCC): ICD-10-CM

## 2023-07-18 PROCEDURE — 93282 PRGRMG EVAL IMPLANTABLE DFB: CPT | Performed by: INTERNAL MEDICINE

## 2023-07-18 PROCEDURE — 99203 OFFICE O/P NEW LOW 30 MIN: CPT

## 2023-07-18 NOTE — PATIENT INSTRUCTIONS
[employer]. com for possible coupons for Xarelto.   -Daily compression stockings 15-20 mmHg (on in AM, off in PM). -Continue to follow up with hematology.   -Continue xarelto as prescribed by hematology. Deep Vein Thrombosis   AMBULATORY CARE:   A deep vein thrombosis (DVT)  is a blood clot that forms in a deep vein of the body. The deep veins in the legs, thighs, and hips are the most common sites for DVT. A DVT can also occur in a deep vein within your arms. The clot prevents the normal flow of blood in the vein. The blood backs up and causes pain and swelling. The DVT can break into smaller pieces and travel to your lungs and cause a blockage called a pulmonary embolism. A pulmonary embolism can become life-threatening. Common symptoms include the following:   Swelling    Redness    Warmth, pain, or tenderness       Call your local emergency number (911 in the [address]) if:   You feel lightheaded, short of breath, and have chest pain. You cough up blood. Seek care immediately if:   Your arm or leg feels warm, tender, and painful. It may look swollen and red. Call your doctor or hematologist if:   You have questions or concerns about your condition or care. Treatment for a DVT  may include any of the following:  Blood thinners  help prevent blood clots. Clots can cause strokes, heart attacks, and death. The following are general safety guidelines to follow while you are taking a blood thinner:    Watch for bleeding and bruising while you take blood thinners. Watch for bleeding from your gums or nose. Watch for blood in your urine and bowel movements. Use a soft washcloth on your skin, and a soft toothbrush to brush your teeth. This can keep your skin and gums from bleeding. If you shave, use an electric shaver. Do not play contact sports. Tell your dentist and other healthcare providers that you take a blood thinner. Wear a bracelet or necklace that says you take this medicine.      Do not start or stop any other medicines unless your healthcare provider tells you to. Many medicines cannot be used with blood thinners. Take your blood thinner exactly as prescribed by your healthcare provider. Do not skip does or take less than prescribed. Tell your provider right away if you forget to take your blood thinner, or if you take too much. Warfarin  is a blood thinner that you may need to take. The following are things you should be aware of if you take warfarin:     Foods and medicines can affect the amount of warfarin in your blood. Do not make major changes to your diet while you take warfarin. Warfarin works best when you eat about the same amount of vitamin K every day. Vitamin K is found in green leafy vegetables and certain other foods. Ask for more information about what to eat when you are taking warfarin. You will need to see your healthcare provider for follow-up visits when you are on warfarin. You will need regular blood tests. These tests are used to decide how much medicine you need. Clot busters  are emergency medicines that work to dissolve blood clots. A vena cava filter  may be placed inside your vena cava to treat your DVT. The vena cava is a large vein that brings blood from your lower body up to your heart. The filter may help trap pieces of a blood clot and prevent them from going into your lungs. Surgery  called a thrombectomy may be done to remove the clot. A procedure called thrombolysis may instead be done to inject a clot buster that helps break the clot apart. Manage a DVT:   Wear pressure stockings as directed. The stockings put pressure on your legs. This improves blood flow and helps prevent clots. Wear the stockings during the day. Do not wear them when you sleep. Elevate your legs above the level of your heart. Elevate your legs when you sit or lie down, as often as you can. This will help decrease swelling and pain.  Prop your legs on pillows or blankets to keep them elevated comfortably. Prevent a DVT:   Exercise regularly to help increase your blood flow. Walking is a good low-impact exercise. Talk to your healthcare provider about the best exercise plan for you. Change your body position or move around often. Move and stretch in your seat several times each hour if you travel by car or work at a desk. In an airplane, get up and walk every hour. Move your legs by tightening and releasing your leg muscles while sitting. You can move your legs while sitting by raising and lowering your heels. Keep your toes on the floor while you do this. You can also raise and lower your toes while keeping your heels on the floor. Maintain a healthy weight. Ask your healthcare provider what a healthy weight is for you. Ask him or her to help you create a weight loss plan if you are overweight. Do not smoke. Nicotine and other chemicals in cigarettes and cigars can damage blood vessels and make it more difficult to manage your DVT. Ask your healthcare provider for information if you currently smoke and need help to quit. E-cigarettes or smokeless tobacco still contain nicotine. Talk to your healthcare provider before you use these products. Ask about birth control if you are a woman who takes the pill. A birth control pill increases the risk for PE in certain women. The risk is higher if you are also older than 35, smoke cigarettes, or have a blood clotting disorder. Talk to your healthcare provider about other ways to prevent pregnancy, such as a cervical cap or intrauterine device (IUD). Follow up with your doctor or hematologist as directed: You may need to come in regularly for scans to check for blood clots. Your blood may be checked to see how long it takes to clot. Your doctor or specialist will tell you if you need to have this test and how often to have it.  Write down your questions so you remember to ask them during your visits. © Copyright Jeremiah [employer] 2022 Information is for End User's use only and may not be sold, redistributed or otherwise used for commercial purposes. The above information is an  only. It is not intended as medical advice for individual conditions or treatments. Talk to your doctor, nurse or pharmacist before following any medical regimen to see if it is safe and effective for you.

## 2023-08-15 ENCOUNTER — OFFICE VISIT (OUTPATIENT)
Dept: CARDIOLOGY CLINIC | Facility: CLINIC | Age: 73
End: 2023-08-15
Payer: MEDICARE

## 2023-08-15 ENCOUNTER — IN-CLINIC DEVICE VISIT (OUTPATIENT)
Dept: CARDIOLOGY CLINIC | Facility: CLINIC | Age: 73
End: 2023-08-15
Payer: MEDICARE

## 2023-08-15 VITALS
RESPIRATION RATE: 16 BRPM | HEART RATE: 68 BPM | WEIGHT: 250 LBS | OXYGEN SATURATION: 95 % | HEIGHT: 62 IN | BODY MASS INDEX: 46.01 KG/M2 | SYSTOLIC BLOOD PRESSURE: 120 MMHG | DIASTOLIC BLOOD PRESSURE: 68 MMHG

## 2023-08-15 DIAGNOSIS — Z45.010 ENCOUNTER FOR CHECKING AND TESTING OF CARDIAC PACEMAKER PULSE GENERATOR (BATTERY): ICD-10-CM

## 2023-08-15 DIAGNOSIS — I48.0 PAROXYSMAL ATRIAL FIBRILLATION (HCC): ICD-10-CM

## 2023-08-15 DIAGNOSIS — Z45.010 PACER AT END OF BATTERY LIFE: ICD-10-CM

## 2023-08-15 DIAGNOSIS — I44.2 INTERMITTENT COMPLETE HEART BLOCK (HCC): Primary | ICD-10-CM

## 2023-08-15 DIAGNOSIS — I49.5 SICK SINUS SYNDROME (HCC): Primary | ICD-10-CM

## 2023-08-15 PROCEDURE — 99214 OFFICE O/P EST MOD 30 MIN: CPT | Performed by: INTERNAL MEDICINE

## 2023-08-15 PROCEDURE — 93279 PRGRMG DEV EVAL PM/LDLS PM: CPT | Performed by: INTERNAL MEDICINE

## 2023-08-15 RX ORDER — CEFAZOLIN SODIUM 2 G/50ML
2000 SOLUTION INTRAVENOUS ONCE
Status: CANCELLED | OUTPATIENT
Start: 2023-08-22 | End: 2023-08-15

## 2023-08-15 RX ORDER — ROSUVASTATIN CALCIUM 5 MG/1
TABLET, COATED ORAL
Status: ON HOLD | COMMUNITY
Start: 2023-08-04 | End: 2023-08-22 | Stop reason: ALTCHOICE

## 2023-08-15 RX ORDER — RIVAROXABAN 20 MG/1
20 TABLET, FILM COATED ORAL DAILY
COMMUNITY
Start: 2023-08-03

## 2023-08-15 RX ORDER — LISINOPRIL 2.5 MG/1
2.5 TABLET ORAL DAILY
COMMUNITY
Start: 2023-08-04

## 2023-08-15 NOTE — ASSESSMENT & PLAN NOTE
Dual-chamber Medtronic pacer in place. It has reached device end-of-life parameters and has reached elective replacement time and change out will be planned.

## 2023-08-15 NOTE — PROGRESS NOTES
Patient ID: Zachariah Reyes is a 67 y.o. female. Plan:      Intermittent complete heart block ([address])  Dual-chamber Medtronic pacer in place. It has reached device end-of-life parameters and has reached elective replacement time and change out will be planned. DVT (deep venous thrombosis) ([address])  Recent. We will simply hold Xarelto 1 day prior to the device change out. Paroxysmal atrial fibrillation (HCC)  Not seen on any device check. Follow up Plan/Other summary comments:  After device change out. HPI: Patient is seen in follow-up today regarding the above issues. Device has triggered end-of-life parameters. No recent chest pain or chest pressure. No palpitations syncope or near syncope. A Medtronic pacemaker was placed in June of 2012. Most recent or relevant cardiac/vascular testing:    Myoview 03/09/2016: Normal.         Past Surgical History:   Procedure Laterality Date   • A-V CARDIAC PACEMAKER INSERTION  06/2012    dual chamber Medtronic   • CARDIAC PACEMAKER PLACEMENT         Lipid Profile: No results found for: "CHOL", "TRIG", "HDL", "LDL"      Review of Systems   10  point ROS  was otherwise non pertinent or negative except as per HPI or as below. Gait: Normal.        Objective:     /68 (BP Location: Left arm, Patient Position: Sitting, Cuff Size: Large)   Pulse 68   Resp 16   Ht 5' 2" (1.575 m)   Wt 113 kg (250 lb)   SpO2 95%   BMI 45.73 kg/m²     PHYSICAL EXAM:    General:  Normal appearance in no distress. Eyes:  Anicteric. Oral mucosa:  Moist.  Neck:  No JVD. Carotid upstrokes are brisk without bruits. No masses. Chest:  Clear to auscultation  Pacemaker left subclavian well-healed. .  Cardiac:  No palpable PMI. Normal S1 and S2. No murmur gallop or rub. Abdomen:  Soft and nontender. No palpable organomegaly or aortic enlargement. Extremities:  No peripheral edema. Musculoskeletal:  Symmetric.    Vascular:  Femoral pulses are brisk without bruits. Popliteal pulses are intact bilaterally. Pedal pulses are intact. Neuro:  Grossly symmetric. Psych:  Alert and oriented x3.         Current Outpatient Medications:   •  cholecalciferol (VITAMIN D3) 1,000 units tablet, Take 1,000 Units by mouth daily, Disp: , Rfl:   •  glipiZIDE (GLUCOTROL) 5 mg tablet, TAKE 1 TABLET BY MOUTH TWICE A DAY  BEFORE BREAKFAST AND SUPPER (2 TABLETS TOTAL DAILY), Disp: , Rfl:   •  hydrochlorothiazide (HYDRODIURIL) 25 mg tablet, Take 25 mg by mouth daily, Disp: , Rfl:   •  levothyroxine 200 mcg tablet, Take 200 mcg by mouth daily, Disp: , Rfl:   •  lisinopril (ZESTRIL) 2.5 mg tablet, Take 2.5 mg by mouth daily, Disp: , Rfl:   •  metoprolol succinate (TOPROL-XL) 50 mg 24 hr tablet, Take 50 mg by mouth 2 (two) times a day, Disp: , Rfl:   •  semaglutide, 0.25 or 0.5 mg/dose, (Ozempic, 0.25 or 0.5 MG/DOSE,) 2 mg/3 mL injection pen, Inject 0.5 mg under the skin, Disp: , Rfl:   •  Soliqua 100-33 UNT-MCG/ML injection pen, INJECTS 33 UNITS AT BEDTIME   STOP TOUJEO, Disp: , Rfl:   •  traMADol (ULTRAM) 50 mg tablet, TAKE ONE (1) TABLET BY MOUTH DAILY AS NEEDED, Disp: , Rfl:   •  Xarelto 20 MG tablet, Take 20 mg by mouth daily, Disp: , Rfl:   •  atorvastatin (LIPITOR) 10 mg tablet, TAKE ORALLY 1 ORAL TABLET EVERY 24 HOURS FOR 90 DAYS., Disp: , Rfl:   •  insulin aspart (NovoLOG) 100 units/mL injection, Inject 8 Units under the skin 3 (three) times a day before meals (Patient not taking: Reported on 8/31/2022), Disp: 10 mL, Rfl: 0  •  insulin glargine (Toujeo SoloStar) 300 units/mL CONCENTRATED U-300 injection pen (1-unit dial), Inject 30 Units under the skin daily at bedtime (Patient not taking: Reported on 8/15/2023), Disp: 4.5 mL, Rfl: 0  •  levothyroxine 150 mcg tablet, Take 200 mcg by mouth daily  (Patient not taking: Reported on 8/31/2022), Disp: , Rfl:   •  metoprolol tartrate (LOPRESSOR) 50 mg tablet, Take 50 mg by mouth 2 (two) times a day (Patient not taking: Reported on 8/15/2023), Disp: , Rfl:   •  rivaroxaban (Xarelto Starter Pack) 15 & 20 MG starter pack, Take 15 mg by mouth twice daily for 21 days, then 20 mg once daily thereafter.  (Patient not taking: Reported on 8/15/2023), Disp: 51 each, Rfl: 0  •  rosuvastatin (CRESTOR) 5 mg tablet, TAKE 1 TABLET (5 MG TOTAL) BY MOUTH NIGHTLY., Disp: , Rfl:   No Known Allergies  Past Medical History:   Diagnosis Date   • Atrial fibrillation ([address])     paroxsysmal   • Diabetes mellitus ([address])     Type 1   • History of echocardiogram 08/16/2011    EF 55%, Normal study   • History of nuclear stress test 03/09/2016    EF 63%, Normal.   • Hyperlipidemia    • Hypertension    • Second degree heart block 2012    s/p dual-chamber Medtronic pacemaker 6/2012           Social History     Tobacco Use   Smoking Status Never   Smokeless Tobacco Never

## 2023-08-15 NOTE — H&P (VIEW-ONLY)
Patient ID: Thomas Amador is a 67 y.o. female. Plan:      Intermittent complete heart block ([address])  Dual-chamber Medtronic pacer in place. It has reached device end-of-life parameters and has reached elective replacement time and change out will be planned. DVT (deep venous thrombosis) ([address])  Recent. We will simply hold Xarelto 1 day prior to the device change out. Paroxysmal atrial fibrillation (HCC)  Not seen on any device check. Follow up Plan/Other summary comments:  After device change out. HPI: Patient is seen in follow-up today regarding the above issues. Device has triggered end-of-life parameters. No recent chest pain or chest pressure. No palpitations syncope or near syncope. A Medtronic pacemaker was placed in June of 2012. Most recent or relevant cardiac/vascular testing:    Myoview 03/09/2016: Normal.         Past Surgical History:   Procedure Laterality Date   • A-V CARDIAC PACEMAKER INSERTION  06/2012    dual chamber Medtronic   • CARDIAC PACEMAKER PLACEMENT         Lipid Profile: No results found for: "CHOL", "TRIG", "HDL", "LDL"      Review of Systems   10  point ROS  was otherwise non pertinent or negative except as per HPI or as below. Gait: Normal.        Objective:     /68 (BP Location: Left arm, Patient Position: Sitting, Cuff Size: Large)   Pulse 68   Resp 16   Ht 5' 2" (1.575 m)   Wt 113 kg (250 lb)   SpO2 95%   BMI 45.73 kg/m²     PHYSICAL EXAM:    General:  Normal appearance in no distress. Eyes:  Anicteric. Oral mucosa:  Moist.  Neck:  No JVD. Carotid upstrokes are brisk without bruits. No masses. Chest:  Clear to auscultation  Pacemaker left subclavian well-healed. .  Cardiac:  No palpable PMI. Normal S1 and S2. No murmur gallop or rub. Abdomen:  Soft and nontender. No palpable organomegaly or aortic enlargement. Extremities:  No peripheral edema. Musculoskeletal:  Symmetric.    Vascular:  Femoral pulses are brisk without bruits. Popliteal pulses are intact bilaterally. Pedal pulses are intact. Neuro:  Grossly symmetric. Psych:  Alert and oriented x3.         Current Outpatient Medications:   •  cholecalciferol (VITAMIN D3) 1,000 units tablet, Take 1,000 Units by mouth daily, Disp: , Rfl:   •  glipiZIDE (GLUCOTROL) 5 mg tablet, TAKE 1 TABLET BY MOUTH TWICE A DAY  BEFORE BREAKFAST AND SUPPER (2 TABLETS TOTAL DAILY), Disp: , Rfl:   •  hydrochlorothiazide (HYDRODIURIL) 25 mg tablet, Take 25 mg by mouth daily, Disp: , Rfl:   •  levothyroxine 200 mcg tablet, Take 200 mcg by mouth daily, Disp: , Rfl:   •  lisinopril (ZESTRIL) 2.5 mg tablet, Take 2.5 mg by mouth daily, Disp: , Rfl:   •  metoprolol succinate (TOPROL-XL) 50 mg 24 hr tablet, Take 50 mg by mouth 2 (two) times a day, Disp: , Rfl:   •  semaglutide, 0.25 or 0.5 mg/dose, (Ozempic, 0.25 or 0.5 MG/DOSE,) 2 mg/3 mL injection pen, Inject 0.5 mg under the skin, Disp: , Rfl:   •  Soliqua 100-33 UNT-MCG/ML injection pen, INJECTS 33 UNITS AT BEDTIME   STOP TOUJEO, Disp: , Rfl:   •  traMADol (ULTRAM) 50 mg tablet, TAKE ONE (1) TABLET BY MOUTH DAILY AS NEEDED, Disp: , Rfl:   •  Xarelto 20 MG tablet, Take 20 mg by mouth daily, Disp: , Rfl:   •  atorvastatin (LIPITOR) 10 mg tablet, TAKE ORALLY 1 ORAL TABLET EVERY 24 HOURS FOR 90 DAYS., Disp: , Rfl:   •  insulin aspart (NovoLOG) 100 units/mL injection, Inject 8 Units under the skin 3 (three) times a day before meals (Patient not taking: Reported on 8/31/2022), Disp: 10 mL, Rfl: 0  •  insulin glargine (Toujeo SoloStar) 300 units/mL CONCENTRATED U-300 injection pen (1-unit dial), Inject 30 Units under the skin daily at bedtime (Patient not taking: Reported on 8/15/2023), Disp: 4.5 mL, Rfl: 0  •  levothyroxine 150 mcg tablet, Take 200 mcg by mouth daily  (Patient not taking: Reported on 8/31/2022), Disp: , Rfl:   •  metoprolol tartrate (LOPRESSOR) 50 mg tablet, Take 50 mg by mouth 2 (two) times a day (Patient not taking: Reported on 8/15/2023), Disp: , Rfl:   •  rivaroxaban (Xarelto Starter Pack) 15 & 20 MG starter pack, Take 15 mg by mouth twice daily for 21 days, then 20 mg once daily thereafter.  (Patient not taking: Reported on 8/15/2023), Disp: 51 each, Rfl: 0  •  rosuvastatin (CRESTOR) 5 mg tablet, TAKE 1 TABLET (5 MG TOTAL) BY MOUTH NIGHTLY., Disp: , Rfl:   No Known Allergies  Past Medical History:   Diagnosis Date   • Atrial fibrillation ([address])     paroxsysmal   • Diabetes mellitus ([address])     Type 1   • History of echocardiogram 08/16/2011    EF 55%, Normal study   • History of nuclear stress test 03/09/2016    EF 63%, Normal.   • Hyperlipidemia    • Hypertension    • Second degree heart block 2012    s/p dual-chamber Medtronic pacemaker 6/2012           Social History     Tobacco Use   Smoking Status Never   Smokeless Tobacco Never

## 2023-08-22 ENCOUNTER — ANESTHESIA (OUTPATIENT)
Dept: PERIOP | Facility: HOSPITAL | Age: 73
End: 2023-08-22
Payer: MEDICARE

## 2023-08-22 ENCOUNTER — HOSPITAL ENCOUNTER (OUTPATIENT)
Facility: HOSPITAL | Age: 73
Setting detail: OUTPATIENT SURGERY
Discharge: HOME | End: 2023-08-22
Attending: INTERNAL MEDICINE | Admitting: INTERNAL MEDICINE
Payer: MEDICARE

## 2023-08-22 ENCOUNTER — ANESTHESIA EVENT (OUTPATIENT)
Dept: PERIOP | Facility: HOSPITAL | Age: 73
End: 2023-08-22
Payer: MEDICARE

## 2023-08-22 VITALS
SYSTOLIC BLOOD PRESSURE: 151 MMHG | OXYGEN SATURATION: 98 % | BODY MASS INDEX: 46.01 KG/M2 | WEIGHT: 250 LBS | RESPIRATION RATE: 18 BRPM | TEMPERATURE: 97.4 F | DIASTOLIC BLOOD PRESSURE: 64 MMHG | HEART RATE: 68 BPM | HEIGHT: 62 IN

## 2023-08-22 LAB
GLUCOSE SERPL-MCNC: 175 MG/DL (ref 65–140)
GLUCOSE SERPL-MCNC: 182 MG/DL (ref 65–140)

## 2023-08-22 PROCEDURE — C1785 PMKR, DUAL, RATE-RESP: HCPCS | Performed by: INTERNAL MEDICINE

## 2023-08-22 PROCEDURE — 82948 REAGENT STRIP/BLOOD GLUCOSE: CPT

## 2023-08-22 PROCEDURE — 33228 REMV&REPLC PM GEN DUAL LEAD: CPT | Performed by: INTERNAL MEDICINE

## 2023-08-22 DEVICE — IPG W1DR01 AZURE XT DR MRI USA
Type: IMPLANTABLE DEVICE | Site: CHEST | Status: FUNCTIONAL
Brand: AZURE™ XT DR MRI SURESCAN™

## 2023-08-22 RX ORDER — FENTANYL CITRATE 50 UG/ML
INJECTION, SOLUTION INTRAMUSCULAR; INTRAVENOUS AS NEEDED
Status: DISCONTINUED | OUTPATIENT
Start: 2023-08-22 | End: 2023-08-22

## 2023-08-22 RX ORDER — ONDANSETRON 2 MG/ML
INJECTION INTRAMUSCULAR; INTRAVENOUS AS NEEDED
Status: DISCONTINUED | OUTPATIENT
Start: 2023-08-22 | End: 2023-08-22

## 2023-08-22 RX ORDER — PROPOFOL 10 MG/ML
INJECTION, EMULSION INTRAVENOUS AS NEEDED
Status: DISCONTINUED | OUTPATIENT
Start: 2023-08-22 | End: 2023-08-22

## 2023-08-22 RX ORDER — LIDOCAINE HYDROCHLORIDE 10 MG/ML
INJECTION, SOLUTION EPIDURAL; INFILTRATION; INTRACAUDAL; PERINEURAL AS NEEDED
Status: DISCONTINUED | OUTPATIENT
Start: 2023-08-22 | End: 2023-08-22 | Stop reason: HOSPADM

## 2023-08-22 RX ORDER — MIDAZOLAM HYDROCHLORIDE 2 MG/2ML
INJECTION, SOLUTION INTRAMUSCULAR; INTRAVENOUS AS NEEDED
Status: DISCONTINUED | OUTPATIENT
Start: 2023-08-22 | End: 2023-08-22

## 2023-08-22 RX ORDER — SODIUM CHLORIDE, SODIUM LACTATE, POTASSIUM CHLORIDE, CALCIUM CHLORIDE 600; 310; 30; 20 MG/100ML; MG/100ML; MG/100ML; MG/100ML
INJECTION, SOLUTION INTRAVENOUS CONTINUOUS PRN
Status: DISCONTINUED | OUTPATIENT
Start: 2023-08-22 | End: 2023-08-22

## 2023-08-22 RX ORDER — ONDANSETRON 2 MG/ML
4 INJECTION INTRAMUSCULAR; INTRAVENOUS ONCE AS NEEDED
Status: DISCONTINUED | OUTPATIENT
Start: 2023-08-22 | End: 2023-08-22 | Stop reason: HOSPADM

## 2023-08-22 RX ORDER — PROPOFOL 10 MG/ML
INJECTION, EMULSION INTRAVENOUS CONTINUOUS PRN
Status: DISCONTINUED | OUTPATIENT
Start: 2023-08-22 | End: 2023-08-22

## 2023-08-22 RX ORDER — CEFAZOLIN SODIUM 2 G/50ML
2000 SOLUTION INTRAVENOUS ONCE
Status: COMPLETED | OUTPATIENT
Start: 2023-08-22 | End: 2023-08-22

## 2023-08-22 RX ORDER — ACETAMINOPHEN 325 MG/1
650 TABLET ORAL EVERY 4 HOURS PRN
Status: DISCONTINUED | OUTPATIENT
Start: 2023-08-22 | End: 2023-08-22 | Stop reason: HOSPADM

## 2023-08-22 RX ORDER — SODIUM CHLORIDE 9 MG/ML
INJECTION, SOLUTION INTRAVENOUS AS NEEDED
Status: DISCONTINUED | OUTPATIENT
Start: 2023-08-22 | End: 2023-08-22 | Stop reason: HOSPADM

## 2023-08-22 RX ORDER — SODIUM CHLORIDE, SODIUM LACTATE, POTASSIUM CHLORIDE, CALCIUM CHLORIDE 600; 310; 30; 20 MG/100ML; MG/100ML; MG/100ML; MG/100ML
75 INJECTION, SOLUTION INTRAVENOUS CONTINUOUS
Status: ACTIVE | OUTPATIENT
Start: 2023-08-22 | End: 2023-08-22

## 2023-08-22 RX ORDER — FENTANYL CITRATE/PF 50 MCG/ML
25 SYRINGE (ML) INJECTION
Status: DISCONTINUED | OUTPATIENT
Start: 2023-08-22 | End: 2023-08-22 | Stop reason: HOSPADM

## 2023-08-22 RX ORDER — ROSUVASTATIN CALCIUM 5 MG/1
5 TABLET, COATED ORAL DAILY
COMMUNITY

## 2023-08-22 RX ADMIN — FENTANYL CITRATE 25 MCG: 50 INJECTION, SOLUTION INTRAMUSCULAR; INTRAVENOUS at 09:59

## 2023-08-22 RX ADMIN — PHENYLEPHRINE HYDROCHLORIDE 50 MCG/MIN: 10 INJECTION INTRAVENOUS at 10:01

## 2023-08-22 RX ADMIN — ONDANSETRON 4 MG: 2 INJECTION INTRAMUSCULAR; INTRAVENOUS at 10:20

## 2023-08-22 RX ADMIN — PROPOFOL 100 MCG/KG/MIN: 10 INJECTION, EMULSION INTRAVENOUS at 09:52

## 2023-08-22 RX ADMIN — CEFAZOLIN SODIUM 2000 MG: 2 SOLUTION INTRAVENOUS at 09:33

## 2023-08-22 RX ADMIN — SODIUM CHLORIDE, SODIUM LACTATE, POTASSIUM CHLORIDE, AND CALCIUM CHLORIDE: .6; .31; .03; .02 INJECTION, SOLUTION INTRAVENOUS at 09:36

## 2023-08-22 RX ADMIN — PROPOFOL 30 MG: 10 INJECTION, EMULSION INTRAVENOUS at 09:52

## 2023-08-22 RX ADMIN — ACETAMINOPHEN 650 MG: 325 TABLET ORAL at 11:13

## 2023-08-22 RX ADMIN — FENTANYL CITRATE 25 MCG: 50 INJECTION, SOLUTION INTRAMUSCULAR; INTRAVENOUS at 10:08

## 2023-08-22 RX ADMIN — FENTANYL CITRATE 50 MCG: 50 INJECTION, SOLUTION INTRAMUSCULAR; INTRAVENOUS at 09:52

## 2023-08-22 RX ADMIN — MIDAZOLAM 2 MG: 1 INJECTION INTRAMUSCULAR; INTRAVENOUS at 09:45

## 2023-08-22 NOTE — DISCHARGE INSTR - AVS FIRST PAGE
Instructions for going home after your Pacemaker or Defibrillator Surgery        WHAT YOU NEED TO KNOW:   A pacemaker or defirillator is a battery-powered device that is implanted into your chest to help regulate your heart rate or prevent fainting or arrhythmia. DISCHARGE INSTRUCTIONS:   Medicines:   Pain medicine: You may need medicine to take away or decrease pain. Learn how to take your medicine. Ask what medicine and how much you should take. Be sure you know how, when, and how often to take it. Do not wait until the pain is severe before you take your medicine. Tell caregivers if your pain does not decrease. Follow up with your cardiologist after your procedure: You may need a follow-up visit 7 to 10 days after you leave the hospital. Your cardiologist will check your wound and make sure that your device is working correctly. These should already be scheduled but if not or the time is inconvenient, call the office promptly. Follow the instructions to check your device:  Your cardiologist will check your device and the battery regularly, usually every 3 to 6 months. A computer may be used to  check your device over the telephone between visits. Pacemaker or defibrillator batteries usually last 5 to 11 years. The entire unit will be replaced when the battery gets low. This is a simpler procedure than the original one to implant your pacemaker or defibrillator. Wound care:  Keep your incisions clean and dry for 7 to 10 days after your procedure. There may be glue on the wound. This needs 24 hours to cure so no washing of the site before then. Afterwards if the wound gets wet just pat it dry. Do not scrub it. Contact the office if the area is around the device is unusually red or painful, tender, or has drainage. Also the office needs to be contacted if there is fever. Arm movement and lifting:  Be careful using the arm on the side of your device.  Limit the movement of your arm for the first 24 hours after your procedure. This helps the leads stay in place and helps your wound heal. Ask your healthcare provider when you can drive after your procedure. Living with your pacemaker or defibrillator:     Carry your pacemaker or defibrillator ID card: Make sure you receive a pacemaker or defibrillator ID card. Carry it with you at all times. It lists important information about your pacemaker. Show it to airport security if you travel. Avoid electrical interference:  Avoid welding equipment, MRI machines, and other equipment with large magnets or electric fields. These things could interfere with how your pacemaker works. Use your cell phone on the ear opposite from your pacemaker. Do not carry your cell phone in your shirt pocket over your chest.  Most new devices however are compatible with an MRI if none under the proper circumstances. Seek care immediately if:   . You feel your heart suddenly beating very slowly or quickly. You become too weak or dizzy to stand, or you pass out. You feel lightheaded, short of breath, or have chest pain.

## 2023-08-22 NOTE — INTERVAL H&P NOTE
H&P reviewed. After examining the patient, I find no changed to the H&P since it had been written. Patient re-evaluated.  Accept as history and physical.    Brian Becker MD/August 22, 2023/9:28 AM

## 2023-08-22 NOTE — ANESTHESIA POSTPROCEDURE EVALUATION
Post-Op Assessment Note    CV Status:  Stable  Pain Score: 5 (back pain)    Pain management: adequate     Mental Status:  Arousable   Hydration Status:  Stable   PONV Controlled:  None   Airway Patency:  Patent      Post Op Vitals Reviewed: Yes      Staff: CRNA         No notable events documented.     BP   115/58   Temp  97.9   Pulse  80   Resp   16   SpO2   99%

## 2023-08-22 NOTE — OP NOTE
OPERATIVE REPORT  PATIENT NAME: Derek Galvan    :  1950  MRN: [id number]  Pt Location: [facility] OR ROOM 01    SURGERY DATE: 2023    Surgeon(s) and Role:     * Aurelio Brownlee MD - Primary    Preop Diagnosis:  Intermittent complete heart block ([address]) [I44.2]  Pacer at end of battery life [Z45.010]    Post-Op Diagnosis Codes:     * Intermittent complete heart block ([address]) [I44.2]     * Pacer at end of battery life [Z45.010]  Procedure:  Dual chamber pacemaker explant and new pacemaker implant. Indication:  Device battery end of life. Site:  Left subclavian. Complications:  None. Estimated blood loss:  Less than 10 cc: Anesthesia:  Local with sedation. Device Information:  Aviate        Procedure details: The patient was prepped and draped in the usual sterile manner after consent was obtained and they were probably it identified. Incision was made around the prior device implantation scar. Cutdown was then made with sharp and blunt dissection to the device pocket. The pocket was opened sharply. The prior device was removed from the pocket and detached from the leads. The pocket was copiously irrigated with sterile saline. There was good hemostasis. The new device was brought to the table, attached to the leads,  placed into the pocket,  and secured to the underlying tissue entry site with 0 silk through the device header block. 2-0 Vicryl was then used for interrupted closure of the pocket. 2-0 Vicryl was then used for a running closure of the subcutaneous fascia and fat. 4-0 Vicryl was then used for interrupted subcuticular closure of the skin. Dermabond was placed on the wound and the patient was returned to the recovery room in good condition.         SIGNATURE: Aurelio Brownlee MD  DATE: 2023  TIME: 10:40 AM

## 2023-08-22 NOTE — ANESTHESIA PREPROCEDURE EVALUATION
Procedure:  REMOVE AND INSERTION PACEMAKER CHANGE OUT (Left: Chest)    Relevant Problems   CARDIO   (+) Acute deep vein thrombosis (DVT) of proximal vein of lower extremity (HCC)   (+) Benign essential HTN   (+) DVT (deep venous thrombosis) (HCC)   (+) Intermittent complete heart block (HCC)   (+) Paroxysmal atrial fibrillation (HCC)      ENDO   (+) DM type 2 causing renal disease, not at goal [facility])      /RENAL   (+) ROBINA (acute kidney injury) ([address])      EKG (07/2023):  EKG is a sinus rhythm at 67 beats a minute with a normal axis.  There   is no definitive acute ST or T wave changes.  There is LVH by voltage   Criteria    Lab Results   Component Value Date    HGBA1C 10.3 (H) 06/07/2023         Lab Results   Component Value Date    WBC 8.71 07/06/2023    HGB 14.2 07/06/2023    HCT 44.5 07/06/2023    MCV 95 07/06/2023     07/06/2023        Physical Exam    Airway    Mallampati score: II  TM Distance: >3 FB  Neck ROM: full     Dental   upper dentures,     Cardiovascular  Rhythm: regular, Rate: normal,     Pulmonary  Breath sounds clear to auscultation,     Other Findings  Intercisor Distance > 3cm          Anesthesia Plan  ASA Score- 3     Anesthesia Type- IV sedation with anesthesia with ASA Monitors. Additional Monitors:   Airway Plan:     Comment: NPO appropriate. Discussed benefits/risks of monitored anesthetic care which involves providing a dynamic level of mild to deep sedation. Complications include awareness and/or airway obstruction/aspiration which may necessitate conversion to general anesthesia. All questions answered. Patient understands and wishes to proceed. .       Plan Factors-Exercise tolerance (METS): >4 METS. Chart reviewed. EKG reviewed. Existing labs reviewed. Induction-     Postoperative Plan- Plan for postoperative opioid use. Informed Consent- Anesthetic plan and risks discussed with patient. I personally reviewed this patient with the CRNA.  Discussed and agreed on the Anesthesia Plan with the CRNA. Tito Swenson

## 2023-08-23 NOTE — PROGRESS NOTES
Device check in person pacer. Hx of PAF on xarelto. CESAR arrange for change out.         Current Outpatient Medications:   •  cholecalciferol (VITAMIN D3) 1,000 units tablet, Take 1,000 Units by mouth daily, Disp: , Rfl:   •  glipiZIDE (GLUCOTROL) 5 mg tablet, TAKE 1 TABLET BY MOUTH TWICE A DAY  BEFORE BREAKFAST AND SUPPER (2 TABLETS TOTAL DAILY), Disp: , Rfl:   •  hydrochlorothiazide (HYDRODIURIL) 25 mg tablet, Take 25 mg by mouth daily, Disp: , Rfl:   •  levothyroxine 150 mcg tablet, Take 175 mcg by mouth daily, Disp: , Rfl:   •  lisinopril (ZESTRIL) 2.5 mg tablet, Take 2.5 mg by mouth daily, Disp: , Rfl:   •  metoprolol succinate (TOPROL-XL) 50 mg 24 hr tablet, Take 50 mg by mouth 2 (two) times a day, Disp: , Rfl:   •  rosuvastatin (CRESTOR) 5 mg tablet, Take 5 mg by mouth daily, Disp: , Rfl:   •  semaglutide, 0.25 or 0.5 mg/dose, (Ozempic, 0.25 or 0.5 MG/DOSE,) 2 mg/3 mL injection pen, Inject 0.5 mg under the skin, Disp: , Rfl:   •  Soliqua 100-33 UNT-MCG/ML injection pen, INJECTS 33 UNITS AT BEDTIME   STOP TOUJEO, Disp: , Rfl:   •  traMADol (ULTRAM) 50 mg tablet, TAKE ONE (1) TABLET BY MOUTH DAILY AS NEEDED, Disp: , Rfl:   •  Xarelto 20 MG tablet, Take 20 mg by mouth daily, Disp: , Rfl:

## 2023-08-24 NOTE — PROGRESS NOTES
Device check in person pacer. Ratnaib on xarelto. Battery CESAR arranged change out.       Current Outpatient Medications:   •  cholecalciferol (VITAMIN D3) 1,000 units tablet, Take 1,000 Units by mouth daily, Disp: , Rfl:   •  glipiZIDE (GLUCOTROL) 5 mg tablet, TAKE 1 TABLET BY MOUTH TWICE A DAY  BEFORE BREAKFAST AND SUPPER (2 TABLETS TOTAL DAILY), Disp: , Rfl:   •  hydrochlorothiazide (HYDRODIURIL) 25 mg tablet, Take 25 mg by mouth daily, Disp: , Rfl:   •  levothyroxine 150 mcg tablet, Take 175 mcg by mouth daily, Disp: , Rfl:   •  lisinopril (ZESTRIL) 2.5 mg tablet, Take 2.5 mg by mouth daily, Disp: , Rfl:   •  metoprolol succinate (TOPROL-XL) 50 mg 24 hr tablet, Take 50 mg by mouth 2 (two) times a day, Disp: , Rfl:   •  rosuvastatin (CRESTOR) 5 mg tablet, Take 5 mg by mouth daily, Disp: , Rfl:   •  semaglutide, 0.25 or 0.5 mg/dose, (Ozempic, 0.25 or 0.5 MG/DOSE,) 2 mg/3 mL injection pen, Inject 0.5 mg under the skin, Disp: , Rfl:   •  Soliqua 100-33 UNT-MCG/ML injection pen, INJECTS 33 UNITS AT BEDTIME   STOP TOUJEO, Disp: , Rfl:   •  traMADol (ULTRAM) 50 mg tablet, TAKE ONE (1) TABLET BY MOUTH DAILY AS NEEDED, Disp: , Rfl:   •  Xarelto 20 MG tablet, Take 20 mg by mouth daily, Disp: , Rfl:

## 2023-09-05 ENCOUNTER — OFFICE VISIT (OUTPATIENT)
Dept: CARDIOLOGY CLINIC | Facility: CLINIC | Age: 73
End: 2023-09-05

## 2023-09-05 DIAGNOSIS — I44.2 INTERMITTENT COMPLETE HEART BLOCK (HCC): Primary | ICD-10-CM

## 2023-09-05 PROCEDURE — 99024 POSTOP FOLLOW-UP VISIT: CPT | Performed by: INTERNAL MEDICINE

## 2023-09-05 NOTE — PROGRESS NOTES
The patient is seen in the office for a post device wound check. The site is clean and dry without erythema or swelling. There is no discharge or tenderness    The patient is instructed to return to normal activity, normal ROM and normal bathing and to report any sign or symptom of infection.

## 2023-09-12 ENCOUNTER — TELEPHONE (OUTPATIENT)
Dept: HEMATOLOGY ONCOLOGY | Facility: CLINIC | Age: 73
End: 2023-09-12

## 2023-09-12 NOTE — TELEPHONE ENCOUNTER
MARIO  DR jason SWAIN   Who are you speaking with? Patient   If it is not the patient, are they listed on an active communication consent form? N/A   Is this a MARIO or DR jason SWAIN MARIO   Which provider is patient currently scheduled or established with? Dr. Tamera Tomas   What is the original appointment date and time? 10/23/2023 @9AM    At which location is the appointment scheduled to take place? [facility]   Which provider is the patient transitioning care to? Dr. Dennis Lees   What is the new appointment date and time? 10/23/2023 @8:40AM    At which location is the new appointment scheduled to take place? [facility]   What is the reason for this change?  Provider unavailable

## 2023-10-17 ENCOUNTER — HOSPITAL ENCOUNTER (OUTPATIENT)
Dept: NON INVASIVE DIAGNOSTICS | Facility: HOSPITAL | Age: 73
Discharge: HOME | End: 2023-10-17
Payer: MEDICARE

## 2023-10-17 DIAGNOSIS — I82.409 DVT (DEEP VENOUS THROMBOSIS) (HCC): ICD-10-CM

## 2023-10-17 PROCEDURE — 93970 EXTREMITY STUDY: CPT

## 2023-10-23 ENCOUNTER — OFFICE VISIT (OUTPATIENT)
Dept: HEMATOLOGY ONCOLOGY | Facility: CLINIC | Age: 73
End: 2023-10-23
Payer: MEDICARE

## 2023-10-23 VITALS
HEART RATE: 55 BPM | RESPIRATION RATE: 16 BRPM | HEIGHT: 62 IN | SYSTOLIC BLOOD PRESSURE: 124 MMHG | DIASTOLIC BLOOD PRESSURE: 86 MMHG | BODY MASS INDEX: 44.9 KG/M2 | TEMPERATURE: 98.2 F | WEIGHT: 244 LBS | OXYGEN SATURATION: 100 %

## 2023-10-23 DIAGNOSIS — I82.411 ACUTE DEEP VEIN THROMBOSIS (DVT) OF FEMORAL VEIN OF RIGHT LOWER EXTREMITY (HCC): Primary | ICD-10-CM

## 2023-10-23 PROCEDURE — 99214 OFFICE O/P EST MOD 30 MIN: CPT | Performed by: INTERNAL MEDICINE

## 2023-10-23 NOTE — PROGRESS NOTES
[address] [facility] [id number]    Lisa Millan  1950      PRIMARY HEMATOLOGIC/ONCOLOGIC DIAGNOSIS:  1. Non occlusive acute deep vein thrombosis in the common femoral,popliteal, and the paired gastrocnemius veins. Date of diagnosis 7/6/23. Unprovoked. 2. Remote hx of RLE ( about 15 years ago). S/p Coumadin. Unprovoked. INTERIM HISTORY:  The patient presents for a follow-up. She was seen by Dr. Tsang Milling 7/2023 for RLE DVT. The patient underwent repeat dopplers on 10/17/23. Compared to the study of 07/06/2023, the Right Common femoral vein thrombosis appears to have resolved. There is residual chronic, non-occlusive thrombus noted in the right popliteal vein and gastrocnemius veins. The patient states that she has been active. Denies family hx of VTE. On Xarelto.      PAST MEDICAL,PAST SURGICAL, FAMILY AND SOCIAL HISTORY:    Patient Active Problem List   Diagnosis    Class 3 severe obesity due to excess calories without serious comorbidity with body mass index (BMI) of 45.0 to 49.9 in adult [facility])    Benign essential HTN    Intermittent complete heart block (HCC)    Paroxysmal atrial fibrillation (HCC)    Hyperglycemia    ROBINA (acute kidney injury) ([address])    Persistent shortness of breath after COVID-19    Acute deep vein thrombosis (DVT) of proximal vein of lower extremity ([address])    DM type 2 causing renal disease, not at goal [facility])    DVT (deep venous thrombosis) ([address])     Past Medical History:   Diagnosis Date    Atrial fibrillation ([address])     paroxsysmal    Diabetes mellitus ([address])     Type 1    History of echocardiogram 08/16/2011    EF 55%, Normal study    History of nuclear stress test 03/09/2016    EF 63%, Normal.    Hyperlipidemia     Hypertension     Second degree heart block 2012    s/p dual-chamber Medtronic pacemaker 6/2012     Past Surgical History:   Procedure Laterality Date    A-V CARDIAC PACEMAKER INSERTION 06/2012    dual chamber Medtronic    BACK SURGERY      CARDIAC PACEMAKER PLACEMENT      CARDIAC PACEMAKER PLACEMENT Left 8/22/2023    Procedure: REMOVE AND INSERTION PACEMAKER CHANGE OUT;  Surgeon: Brian Becker MD;  Location: [facility];  Service: Cardiology    TONSILLECTOMY       Family History   Problem Relation Age of Onset    Cervical cancer Mother     Leukemia Father     Diabetes Brother      Social History     Socioeconomic History    Marital status:      Spouse name: Not on file    Number of children: Not on file    Years of education: Not on file    Highest education level: Not on file   Occupational History    Not on file   Tobacco Use    Smoking status: Never    Smokeless tobacco: Never   Vaping Use    Vaping Use: Never used   Substance and Sexual Activity    Alcohol use: No    Drug use: No    Sexual activity: Not on file   Other Topics Concern    Not on file   Social History Narrative    Not on file     Social Determinants of Health     Financial Resource Strain: Not on file   Food Insecurity: Not on file   Transportation Needs: Not on file   Physical Activity: Not on file   Stress: Not on file   Social Connections: Not on file   Intimate Partner Violence: Not on file   Housing Stability: Not on file       Current Outpatient Medications:     cholecalciferol (VITAMIN D3) 1,000 units tablet, Take 1,000 Units by mouth daily, Disp: , Rfl:     glipiZIDE (GLUCOTROL) 5 mg tablet, TAKE 1 TABLET BY MOUTH TWICE A DAY  BEFORE BREAKFAST AND SUPPER (2 TABLETS TOTAL DAILY), Disp: , Rfl:     hydrochlorothiazide (HYDRODIURIL) 25 mg tablet, Take 25 mg by mouth daily, Disp: , Rfl:     levothyroxine 150 mcg tablet, Take 175 mcg by mouth daily, Disp: , Rfl:     lisinopril (ZESTRIL) 2.5 mg tablet, Take 2.5 mg by mouth daily, Disp: , Rfl:     metoprolol succinate (TOPROL-XL) 50 mg 24 hr tablet, Take 50 mg by mouth 2 (two) times a day, Disp: , Rfl:     rosuvastatin (CRESTOR) 5 mg tablet, Take 5 mg by mouth daily, Disp: , Rfl:     semaglutide, 0.25 or 0.5 mg/dose, (Ozempic, 0.25 or 0.5 MG/DOSE,) 2 mg/3 mL injection pen, Inject 0.5 mg under the skin, Disp: , Rfl:     Soliqua 100-33 UNT-MCG/ML injection pen, INJECTS 33 UNITS AT BEDTIME   STOP TOUJEO, Disp: , Rfl:     traMADol (ULTRAM) 50 mg tablet, TAKE ONE (1) TABLET BY MOUTH DAILY AS NEEDED, Disp: , Rfl:     Xarelto 20 MG tablet, Take 20 mg by mouth daily, Disp: , Rfl:   No Known Allergies  Vitals:    10/23/23 0816   BP: 124/86   Pulse: 55   Resp: 16   Temp: 98.2 °F (36.8 °C)   SpO2: 100%       ROS:  CONSTITUTIONAL:  No fever. No chills. No dizziness. No weakness. EYES:  No pain, erythema, or discharge. No blurring of vision. ENT:  No sore throat, URI symptoms. No epistaxis. No tinnitus. CARDIOVASCULAR:  No chest pain. No palpitations. No lower extremity edema. RESPIRATORY:  No shortness of breath, cough, pain with respiration, pleuritic chest pain. No hemoptysis. No dyspnea. No paroxysmal nocturnal dyspnea. GASTROINTESTINAL:  Normal appetite. No nausea, vomiting, diarrhea. No pain. No bloating. No melena. GENITOURINARY:  No frequency, urgency, nocturia. No hematuria or dysuria. MUSCULOSKELETAL:  No arthralgias or myalgias. INTEGUMENTARY:  No swelling. No bruising. No contusions. No abrasions. No lymphangitis. NEUROLOGIC:  No headache. No neck pain. No numbness or tingling of the extremities. No weakness. PSYCHIATRIC:  No confusion. ENDOCRINE:  No fatigue. No weakness. No history of thyroid, diabetes or adrenal problems. HEMATOLOGICAL:  No bleeding. No petechiae. No bruising.     PHYSICAL EXAM:    GENERAL: AAO x 3  HEENT: AT,NC  CVS: S1S2 RRR  LUNGS: CTA b/l  ABD: NT,ND, +BS  EXTR: no edema  NEURO: CN II-XII grossly intact    LABS:  I have reviewed pertinent labs:  CBC:   Lab Results   Component Value Date    WBC 8.71 07/06/2023    RBC 4.68 07/06/2023    HGB 14.2 07/06/2023    HCT 44.5 07/06/2023    MCV 95 07/06/2023     07/06/2023    MCH 30.3 07/06/2023 MCHC 31.9 07/06/2023    RDW 13.6 07/06/2023    MPV 10.3 07/06/2023    NEUTROABS 5.40 07/06/2023     CMP:   Lab Results   Component Value Date    SODIUM 139 07/06/2023    K 3.5 07/06/2023     07/06/2023    CO2 23 07/06/2023    AGAP 16 07/06/2023    BUN 15 07/06/2023    CREATININE 0.83 07/06/2023    GLUC 172 (H) 07/06/2023    GLUF 391 (H) 10/26/2021    CALCIUM 9.2 07/06/2023    AST 18 07/06/2023    ALT 6 (L) 07/06/2023    ALKPHOS 58 07/06/2023    TP 6.7 07/06/2023    ALB 3.8 07/06/2023    TBILI 0.64 07/06/2023    EGFR 70 07/06/2023     Liver Enzymes:   Lab Results   Component Value Date    AST 18 07/06/2023    ALT 6 (L) 07/06/2023    ALKPHOS 58 07/06/2023    TP 6.7 07/06/2023    ALB 3.8 07/06/2023    TBILI 0.64 07/06/2023     Vitamin B12 No results found for: "IHKRKPOJ31"  Iron Study No results found for: "RETIC", "RETICCTPCT", "FERRITIN", "CONCFE", "TIBC", "PRIMIDONE", "Heddie Sessions", "IRON"  Folate No results found for: "FOLATE"  Magnesium No results found for: "MG"  Phosphorus No results found for: "PHOS"  Coagulation Panel   Lab Results   Component Value Date    PROTIME 13.0 07/06/2023    INR 0.98 07/06/2023    PTT 28 07/06/2023     IMAGING:  VAS lower limb venous duplex study, complete bilateral    Result Date: 10/17/2023  Narrative:  [facility] REPORT CLINICAL: Indications: Patient presents to determine propagation vs resolution of previously noted DVT in the Right CFV, Popliteal Vein and Gastrocnemius veins discovered on 07/06/23. Operative History: 2012-06-01 Pacemaker Risk Factors The patient has history of HTN, Diabetes (IDDM) and Hyperlipidemia. FINDINGS:  Right          Impression                           Popliteal      E1. Non Occlusive Thrombus (Chronic)  Peroneal       Not Visualized                       Gastrocnemius  E1. Non Occlusive Thrombus (Chronic)   Left           Impression      Peroneal       Not Visualized     CONCLUSION:  Impression: RIGHT LOWER LIMB: There is evidence of chronic, non-occlusive deep vein thrombosis in the popliteal and gastrocnemius veins. No evidence of superficial thrombophlebitis noted. Doppler evaluation shows a normal response to augmentation maneuvers. . Popliteal, posterior tibial and anterior tibial arterial Doppler waveform's are triphasic. LEFT LOWER LIMB: No evidence of acute or chronic deep vein thrombosis. No evidence of superficial thrombophlebitis noted. Doppler evaluation shows a normal response to augmentation maneuvers. Popliteal, posterior tibial and anterior tibial arterial Doppler waveform's are triphasic. Compared to the study of 07/06/2023, the Right Common femoral vein thrombosis appears to have resolved. There is residual chronic, non-occlusive thrombus noted in the right popliteal vein and gastrocnemius veins. Technically difficult/limited study. Some segments may be poorly visualized on today's exam due to patient's pain tolerance. SIGNATURE: Electronically Signed by: Elenore Lesch on 2023-10-17 12:54:48 PM    I reviewed the above laboratory and imaging data. ASSESSMENT/PLAN:  Non occlusive acute deep vein thrombosis in the common femoral,popliteal, and the paired gastrocnemius veins. Date of diagnosis 7/6/23. Remote hx of RLE ( about 15 years ago). S/p Coumadin. Unprovoked. The patient underwent repeat dopplers on 10/17/23. Compared to the study of 07/06/2023, the Right Common femoral vein thrombosis appears to have resolved. There is residual chronic, non-occlusive thrombus noted in the right popliteal vein and gastrocnemius veins. Due to this being a second unprovoked episode indefinite anticoagulation was recommended. The patient will continue Xarelto. Discussed obtaining Hypercoagulable work-up ( will not , but she has 3 children and can be beneficial to know if there is an inherited mutation). Patient refused hypercoagulable work-up at this time. F/u in 6m. Labs prior.

## 2023-11-20 ENCOUNTER — DOCUMENTATION (OUTPATIENT)
Dept: HEMATOLOGY ONCOLOGY | Facility: CLINIC | Age: 73
End: 2023-11-20

## 2023-11-21 ENCOUNTER — IN-CLINIC DEVICE VISIT (OUTPATIENT)
Dept: CARDIOLOGY CLINIC | Facility: CLINIC | Age: 73
End: 2023-11-21
Payer: MEDICARE

## 2023-11-21 DIAGNOSIS — I49.5 SICK SINUS SYNDROME (HCC): Primary | ICD-10-CM

## 2023-11-21 DIAGNOSIS — Z45.010 ENCOUNTER FOR CHECKING AND TESTING OF CARDIAC PACEMAKER PULSE GENERATOR (BATTERY): ICD-10-CM

## 2023-11-21 PROCEDURE — 93280 PM DEVICE PROGR EVAL DUAL: CPT | Performed by: INTERNAL MEDICINE

## 2023-11-28 NOTE — PROGRESS NOTES
Device check in person pacer. Afib on xarelto. High atrial threshold. Will monitor. Normal battery function.         Current Outpatient Medications:   •  cholecalciferol (VITAMIN D3) 1,000 units tablet, Take 1,000 Units by mouth daily, Disp: , Rfl:   •  glipiZIDE (GLUCOTROL) 5 mg tablet, TAKE 1 TABLET BY MOUTH TWICE A DAY  BEFORE BREAKFAST AND SUPPER (2 TABLETS TOTAL DAILY), Disp: , Rfl:   •  hydrochlorothiazide (HYDRODIURIL) 25 mg tablet, Take 25 mg by mouth daily, Disp: , Rfl:   •  levothyroxine 150 mcg tablet, Take 175 mcg by mouth daily, Disp: , Rfl:   •  lisinopril (ZESTRIL) 2.5 mg tablet, Take 2.5 mg by mouth daily, Disp: , Rfl:   •  metoprolol succinate (TOPROL-XL) 50 mg 24 hr tablet, Take 50 mg by mouth 2 (two) times a day, Disp: , Rfl:   •  rosuvastatin (CRESTOR) 5 mg tablet, Take 5 mg by mouth daily, Disp: , Rfl:   •  semaglutide, 0.25 or 0.5 mg/dose, (Ozempic, 0.25 or 0.5 MG/DOSE,) 2 mg/3 mL injection pen, Inject 0.5 mg under the skin, Disp: , Rfl:   •  Soliqua 100-33 UNT-MCG/ML injection pen, INJECTS 33 UNITS AT BEDTIME   STOP TOUJEO, Disp: , Rfl:   •  traMADol (ULTRAM) 50 mg tablet, TAKE ONE (1) TABLET BY MOUTH DAILY AS NEEDED, Disp: , Rfl:   •  Xarelto 20 MG tablet, Take 20 mg by mouth daily, Disp: , Rfl:

## 2023-12-12 ENCOUNTER — TELEPHONE (OUTPATIENT)
Dept: SURGICAL ONCOLOGY | Facility: CLINIC | Age: 73
End: 2023-12-12

## 2023-12-12 NOTE — TELEPHONE ENCOUNTER
Oncology Finance Advocacy Intake and Intervention  Oncology Finance Counselor/Advocate placed call to patient. This writer informed patient that this writer is here to assist patient with billing questions, financial assistance, payment/payment plans, quotes, copayment assistance, insurance optimization, and insurance navigation. This writer conducted a thorough benefit review of copayment, deductible, and out of pocket cost. This information is documented below and has been reviewed with patient. Copayment:  Deductible:  Out of Pocket Cost:  Insurance optimization (Limited benefit vs self-pay):  Patient assistance status:Patient Outreach  Free Drug Applications:Xarelto  Oral Chemo Application:[employer]  BIN#:  PCN#:  GRP#:  Copay:$  Interventions:  Patient returned my call and I explained that I would like to renew her assistance for Xarelto with [employer] and I need to send the application to her to provide financial information as well her signature. The patient was able to provide me with her daughter's e mail address [email or web address] where I could send the application and it would come back to me to process. The provider's portion was given to QIAN FINN to complete. When I collect all of the paperwork I will send it to [employer] to process. Information above was review thoroughly with patient and patient was advise of possible assistance programs/interventions. If any question arise patient can contact this writer at below information. This information was given to patient at time of contact. Mckenzie Hamilton  Phone:[phone]  Email: [email or web address]

## 2023-12-12 NOTE — TELEPHONE ENCOUNTER
Call placed to the patient that went to . Left my call back information and message I am looking to renew her assistance for Xarelto with [employer]. .  I need a signature and financial info. No e mail address on file so I would like to coordinate retrieving that info from her.

## 2023-12-13 ENCOUNTER — TELEPHONE (OUTPATIENT)
Dept: SURGICAL ONCOLOGY | Facility: CLINIC | Age: 73
End: 2023-12-13

## 2023-12-18 ENCOUNTER — TELEPHONE (OUTPATIENT)
Dept: SURGICAL ONCOLOGY | Facility: CLINIC | Age: 73
End: 2023-12-18

## 2023-12-18 NOTE — TELEPHONE ENCOUNTER
[employer] requested page 3 of 8 to be corrected for misspelled patient name.  Application for DAVIS faxed to [employer] [phone]

## 2024-01-02 ENCOUNTER — OFFICE VISIT (OUTPATIENT)
Dept: CARDIOLOGY CLINIC | Facility: CLINIC | Age: 74
End: 2024-01-02
Payer: MEDICARE

## 2024-01-02 VITALS
SYSTOLIC BLOOD PRESSURE: 130 MMHG | DIASTOLIC BLOOD PRESSURE: 88 MMHG | RESPIRATION RATE: 14 BRPM | HEART RATE: 80 BPM | WEIGHT: 243 LBS | HEIGHT: 62 IN | BODY MASS INDEX: 44.72 KG/M2

## 2024-01-02 DIAGNOSIS — I44.2 INTERMITTENT COMPLETE HEART BLOCK (HCC): ICD-10-CM

## 2024-01-02 DIAGNOSIS — I82.401 DEEP VEIN THROMBOSIS (DVT) OF RIGHT LOWER EXTREMITY, UNSPECIFIED CHRONICITY, UNSPECIFIED VEIN (HCC): ICD-10-CM

## 2024-01-02 DIAGNOSIS — E66.01 CLASS 3 SEVERE OBESITY DUE TO EXCESS CALORIES WITHOUT SERIOUS COMORBIDITY WITH BODY MASS INDEX (BMI) OF 45.0 TO 49.9 IN ADULT (HCC): ICD-10-CM

## 2024-01-02 DIAGNOSIS — G51.0 FACIAL PARALYSIS ON RIGHT SIDE: ICD-10-CM

## 2024-01-02 DIAGNOSIS — I48.0 PAROXYSMAL ATRIAL FIBRILLATION (HCC): Primary | ICD-10-CM

## 2024-01-02 PROBLEM — I82.4Y9 ACUTE DEEP VEIN THROMBOSIS (DVT) OF PROXIMAL VEIN OF LOWER EXTREMITY (HCC): Status: RESOLVED | Noted: 2023-07-17 | Resolved: 2024-01-02

## 2024-01-02 PROCEDURE — 93000 ELECTROCARDIOGRAM COMPLETE: CPT | Performed by: INTERNAL MEDICINE

## 2024-01-02 PROCEDURE — 99214 OFFICE O/P EST MOD 30 MIN: CPT | Performed by: INTERNAL MEDICINE

## 2024-01-02 RX ORDER — LEVOTHYROXINE SODIUM 0.1 MG/1
100 TABLET ORAL DAILY
COMMUNITY
Start: 2023-12-01

## 2024-01-02 RX ORDER — ATORVASTATIN CALCIUM 10 MG/1
10 TABLET, FILM COATED ORAL DAILY
COMMUNITY
Start: 2023-12-01

## 2024-01-02 RX ORDER — LEVOTHYROXINE SODIUM 0.07 MG/1
75 TABLET ORAL
COMMUNITY
Start: 2023-10-04

## 2024-01-02 NOTE — PROGRESS NOTES
" Patient ID: Dayanna Ortez is a 73 y.o. female.        Plan:      Paroxysmal atrial fibrillation (HCC)   A listed problem from the past.   Not seen on any device check.      Intermittent complete heart block (HCC)  Change out Medtronic DDD 8/22/2023.    DVT (deep venous thrombosis) (HCC)  Spontaneous in 2023. Now on Xarelto.    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  MRI will be ordered before other considerations.  Already on Xarelto.       Follow up Plan/Other summary comments:  Return in about 1 year (around 1/2/2025).  MRI of the brain is ordered regarding the facial droop and there may be other considerations depending on the results.  She also has a visit with her primary care provider within the next 2 weeks.    HPI: Patient is seen in follow-up today regarding the above issues.  Cardiac wise she has been stable.  Back discomfort precludes fast walking and she sleeps in a recliner.  For 1 month she has had a slight right facial droop with occasional drooling.  Right facial droop is quite subtle on exam today and there are no other changes in her functionality.    A Medtronic pacemaker was placed in June of 2012.     Results for orders placed or performed in visit on 01/02/24   POCT ECG    Impression    Sinus rhythm at 80 bpm.  Normal.         Most recent or relevant cardiac/vascular testing:    Myoview 03/09/2016: Normal.       Past Surgical History:   Procedure Laterality Date    A-V CARDIAC PACEMAKER INSERTION  06/2012    dual chamber Medtronic    BACK SURGERY      CARDIAC PACEMAKER PLACEMENT      CARDIAC PACEMAKER PLACEMENT Left 8/22/2023    Procedure: REMOVE AND INSERTION PACEMAKER CHANGE OUT;  Surgeon: Mikie Landeros MD;  Location: [facility];  Service: Cardiology    TONSILLECTOMY         Lipid Profile: No results found for: \"CHOL\", \"TRIG\", \"HDL\", \"LDL\"      Review of Systems   10  point ROS  was otherwise non pertinent or negative except as per HPI or as below. " "  Gait: Normal.        Objective:     /88   Pulse 80   Resp 14   Ht 5' 2\" (1.575 m)   Wt 110 kg (243 lb)   BMI 44.45 kg/m²     PHYSICAL EXAM:    General:  Normal appearance in no distress.  Eyes:  Anicteric.  Oral mucosa:  Moist.  Neck:  No JVD. Carotid upstrokes are brisk without bruits.  No masses.  Chest:  Clear to auscultation.  Pacer left subclavian region well-healed.  Cardiac:  No palpable PMI.  Normal S1 and S2.  No murmur gallop or rub.  Abdomen:  Soft and nontender. No palpable organomegaly or aortic enlargement.  Extremities:  No peripheral edema.  Musculoskeletal:  Symmetric.   Vascular:  Femoral pulses are brisk without bruits.  Popliteal pulses are intact bilaterally.   Pedal pulses are intact.  Neuro:  Grossly symmetric.  Psych:  Alert and oriented x3.        Current Outpatient Medications:     atorvastatin (LIPITOR) 10 mg tablet, Take 10 mg by mouth daily, Disp: , Rfl:     cholecalciferol (VITAMIN D3) 1,000 units tablet, Take 1,000 Units by mouth daily, Disp: , Rfl:     glipiZIDE (GLUCOTROL) 5 mg tablet, TAKE 1 TABLET BY MOUTH TWICE A DAY  BEFORE BREAKFAST AND SUPPER (2 TABLETS TOTAL DAILY), Disp: , Rfl:     hydrochlorothiazide (HYDRODIURIL) 25 mg tablet, Take 25 mg by mouth daily, Disp: , Rfl:     levothyroxine 100 mcg tablet, Take 100 mcg by mouth daily, Disp: , Rfl:     levothyroxine 150 mcg tablet, Take 175 mcg by mouth daily, Disp: , Rfl:     levothyroxine 75 mcg tablet, Take 75 mcg by mouth daily in the early morning, Disp: , Rfl:     lisinopril (ZESTRIL) 2.5 mg tablet, Take 2.5 mg by mouth daily, Disp: , Rfl:     metoprolol succinate (TOPROL-XL) 50 mg 24 hr tablet, Take 50 mg by mouth 2 (two) times a day, Disp: , Rfl:     rosuvastatin (CRESTOR) 5 mg tablet, Take 5 mg by mouth daily, Disp: , Rfl:     semaglutide, 0.25 or 0.5 mg/dose, (Ozempic, 0.25 or 0.5 MG/DOSE,) 2 mg/3 mL injection pen, Inject 0.5 mg under the skin, Disp: , Rfl:     Soliqua 100-33 UNT-MCG/ML injection pen, INJECTS " 33 UNITS AT BEDTIME   STOP TOUJEO, Disp: , Rfl:     traMADol (ULTRAM) 50 mg tablet, TAKE ONE (1) TABLET BY MOUTH DAILY AS NEEDED, Disp: , Rfl:     Xarelto 20 MG tablet, Take 20 mg by mouth daily, Disp: , Rfl:   No Known Allergies  Past Medical History:   Diagnosis Date    Atrial fibrillation (HCC)     paroxsysmal    Diabetes mellitus (HCC)     Type 1    History of echocardiogram 08/16/2011    EF 55%, Normal study    History of nuclear stress test 03/09/2016    EF 63%, Normal.    Hyperlipidemia     Hypertension     Second degree heart block 2012    s/p dual-chamber Medtronic pacemaker 6/2012           Social History     Tobacco Use   Smoking Status Never   Smokeless Tobacco Never

## 2024-01-02 NOTE — ASSESSMENT & PLAN NOTE
1 month with slight right facial droop.  MRI will be ordered before other considerations.  Already on Xarelto.

## 2024-01-04 DIAGNOSIS — G51.0 FACIAL PARALYSIS ON RIGHT SIDE: Primary | ICD-10-CM

## 2024-01-09 ENCOUNTER — HOSPITAL ENCOUNTER (OUTPATIENT)
Dept: CT IMAGING | Facility: HOSPITAL | Age: 74
Discharge: HOME | End: 2024-01-09
Attending: INTERNAL MEDICINE
Payer: MEDICARE

## 2024-01-09 DIAGNOSIS — G51.0 FACIAL PARALYSIS ON RIGHT SIDE: ICD-10-CM

## 2024-01-09 PROCEDURE — G1004 CDSM NDSC: HCPCS

## 2024-01-09 PROCEDURE — 70450 CT HEAD/BRAIN W/O DYE: CPT

## 2024-03-18 ENCOUNTER — TELEPHONE (OUTPATIENT)
Dept: SURGICAL ONCOLOGY | Facility: CLINIC | Age: 74
End: 2024-03-18

## 2024-04-17 ENCOUNTER — TELEPHONE (OUTPATIENT)
Dept: HEMATOLOGY ONCOLOGY | Facility: CLINIC | Age: 74
End: 2024-04-17

## 2024-04-17 DIAGNOSIS — I82.411 ACUTE DEEP VEIN THROMBOSIS (DVT) OF FEMORAL VEIN OF RIGHT LOWER EXTREMITY (HCC): Primary | ICD-10-CM

## 2024-04-17 NOTE — TELEPHONE ENCOUNTER
Appointment Schedule   Who are you speaking with? Patient   If it is not the patient, are they listed on an active communication consent form? N/A   Which provider is the appointment scheduled with? Dr Moreno   At which location is the appointment scheduled for? [facility]   When is the appointment scheduled?  Please list date and time 5/29/24 8:20 AM   What is the reason for this appointment? Follow up   Did patient voice understanding of the details of this appointment? Yes   Was the no show policy reviewed with patient? Yes     Pt is also asking if the office can please mail her the blood work order slips so she knows what she needs to have done.

## 2024-04-17 NOTE — TELEPHONE ENCOUNTER
Appointment Change  Cancel, Reschedule, Change to Virtual      Who are you speaking with? Patient   If it is not the patient, is the caller listed on the communication consent form? N/A   Which provider is the appointment scheduled with? Dr Moreno   When was the original appointment scheduled?    Please list date and time 4/23/24 @ 8   At which location is the appointment scheduled to take place? [facility]   Was the appointment rescheduled?     Was the appointment changed from an in person visit to a virtual visit?    If so, please list the details of the change. no   What is the reason for the appointment change? Does not know why she had appt       Was [facility] transport scheduled? N/A   Does [facility] transport need to be scheduled for the new visit (if applicable) N/A   Does the patient need an infusion appointment rescheduled? N/A   Does the patient have an upcoming infusion appointment scheduled? If so, when? No   Is the patient undergoing chemotherapy? No   For appointments cancelled with less than 24 hours:  Was the no-show policy reviewed? N/A

## 2024-04-17 NOTE — TELEPHONE ENCOUNTER
Voicemail left for patient to have labs completed prior to follow up 4/23 with Dr. Moreno  Labs updated.  [employer] number left for assistance.

## 2024-05-22 ENCOUNTER — REMOTE DEVICE CLINIC VISIT (OUTPATIENT)
Dept: CARDIOLOGY CLINIC | Facility: CLINIC | Age: 74
End: 2024-05-22
Payer: MEDICARE

## 2024-05-22 DIAGNOSIS — Z95.0 PRESENCE OF PERMANENT CARDIAC PACEMAKER: Primary | ICD-10-CM

## 2024-05-22 PROCEDURE — 93294 REM INTERROG EVL PM/LDLS PM: CPT | Performed by: INTERNAL MEDICINE

## 2024-05-22 PROCEDURE — 93296 REM INTERROG EVL PM/IDS: CPT | Performed by: INTERNAL MEDICINE

## 2024-05-22 NOTE — PROGRESS NOTES
MDT DC PM/NOT MRI CONDITIONAL   CARELINK TRANSMISSION:  BATTERY VOLTAGE ADEQUATE (11.7 YR.).  AP 7.6%  0.9%.  ALL LEAD PARAMETERS WITHIN NORMAL LIMITS.  HX OF HIGH ATRIAL PACING THRESHOLD.  NO SIGNIFICANT HIGH RATE EPISODES.  NORMAL DEVICE FUNCTION.  RG

## 2024-05-29 ENCOUNTER — OFFICE VISIT (OUTPATIENT)
Dept: HEMATOLOGY ONCOLOGY | Facility: CLINIC | Age: 74
End: 2024-05-29
Payer: MEDICARE

## 2024-05-29 VITALS
OXYGEN SATURATION: 100 % | RESPIRATION RATE: 16 BRPM | TEMPERATURE: 97.4 F | BODY MASS INDEX: 44.72 KG/M2 | SYSTOLIC BLOOD PRESSURE: 118 MMHG | HEART RATE: 82 BPM | HEIGHT: 62 IN | DIASTOLIC BLOOD PRESSURE: 72 MMHG | WEIGHT: 243 LBS

## 2024-05-29 DIAGNOSIS — I82.5Z9 CHRONIC DEEP VEIN THROMBOSIS (DVT) OF DISTAL VEIN OF LOWER EXTREMITY, UNSPECIFIED LATERALITY (HCC): Primary | ICD-10-CM

## 2024-05-29 DIAGNOSIS — E11.29 DM TYPE 2 CAUSING RENAL DISEASE, NOT AT GOAL (HCC): ICD-10-CM

## 2024-05-29 DIAGNOSIS — E66.01 CLASS 3 SEVERE OBESITY DUE TO EXCESS CALORIES WITHOUT SERIOUS COMORBIDITY WITH BODY MASS INDEX (BMI) OF 45.0 TO 49.9 IN ADULT (HCC): ICD-10-CM

## 2024-05-29 PROCEDURE — 99214 OFFICE O/P EST MOD 30 MIN: CPT | Performed by: INTERNAL MEDICINE

## 2024-05-29 PROCEDURE — G2211 COMPLEX E/M VISIT ADD ON: HCPCS | Performed by: INTERNAL MEDICINE

## 2024-05-29 NOTE — PROGRESS NOTES
Hematology/Oncology Outpatient Follow- up Note  Dayanna Ortez 73 y.o. 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  She has a history of DVT.  She is doing well.  It was in the right lower extremity.  It was unprovoked.  She had a history many years ago of a DVT on Coumadin and at that time was also unprovoked.  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  She is not short of breath at rest and feeling reasonably well.    Interval History:    PRIMARY HEMATOLOGIC/ONCOLOGIC DIAGNOSIS:  1.Non occlusive acute deep vein thrombosis in the common femoral,popliteal, and the paired gastrocnemius veins. Date of diagnosis 7/6/23. Unprovoked.  2. Remote hx of RLE ( about 15 years ago). S/p Coumadin. Unprovoked.  INTERIM HISTORY:  The patient presents for a follow-up. She was seen by Dr. Garcia 7/2023 for RLE DVT. The patient underwent repeat dopplers on 10/17/23. Compared to the study of 07/06/2023, the Right Common femoral vein thrombosis appears to have resolved. There is residual chronic, non-occlusive thrombus noted in the right popliteal vein and gastrocnemius veins. The patient states that she has been active. Denies family hx of VTE. On Xarelto.  ASSESSMENT/PLAN:  Non occlusive acute deep vein thrombosis in the common femoral,popliteal, and the paired gastrocnemius veins. Date of diagnosis 7/6/23. Remote hx of RLE ( about 15 years ago). S/p Coumadin. Unprovoked. The patient  "underwent repeat dopplers on 10/17/23. Compared to the study of 07/06/2023, the Right Common femoral vein thrombosis appears to have resolved. There is residual chronic, non-occlusive thrombus noted in the right popliteal vein and gastrocnemius veins. Due to this being a second unprovoked episode indefinite anticoagulation was recommended. The patient will continue Xarelto. Discussed obtaining Hypercoagulable work-up ( will not , but she has 3 children and can be beneficial to know if there is an inherited mutation). Patient refused hypercoagulable work-up at this time.  F/u in 6m. Labs prior.     Cancer Staging:  Cancer Staging   No matching staging information was found for the patient.      Molecular Testing:     Previous Hematologic/ Oncologic History:    Oncology History    No history exists.       Current Hematologic/ Oncologic Treatment:       Cycle 1         Test Results:    Imaging: Cardiac EP device report    Result Date: 5/22/2024  Narrative: MDT DC PM/NOT MRI CONDITIONAL CARELINK TRANSMISSION:  BATTERY VOLTAGE ADEQUATE (11.7 YR.).  AP 7.6%  0.9%.  ALL LEAD PARAMETERS WITHIN NORMAL LIMITS.  HX OF HIGH ATRIAL PACING THRESHOLD.  NO SIGNIFICANT HIGH RATE EPISODES.  NORMAL DEVICE FUNCTION.  RG             Labs:   Lab Results   Component Value Date    WBC 8.71 07/06/2023    HGB 14.2 07/06/2023    HCT 44.5 07/06/2023    MCV 95 07/06/2023     07/06/2023     Lab Results   Component Value Date    K 4.6 05/08/2024     05/08/2024    CO2 32 (H) 05/08/2024    BUN 21 05/08/2024    CREATININE 0.94 05/08/2024    GLUF 391 (H) 10/26/2021    CALCIUM 9.8 05/08/2024    CORRECTEDCA 10.2 (H) 10/26/2021    AST 15 05/08/2024    ALT 7 05/08/2024    ALKPHOS 57 05/08/2024    EGFR 63 05/08/2024         No results found for: \"SPEP\", \"UPEP\"    No results found for: \"PSA\"    No results found for: \"CEA\"    No results found for: \"\"    No results found for: \"AFP\"    No results found for: \"IRON\", \"TIBC\", " "\"FERRITIN\"    Lab Results   Component Value Date    HABLYGEJ83 248 05/08/2024         ROS: Review of Systems   Constitutional: Negative.    HENT: Negative.     Eyes: Negative.    Respiratory: Negative.     Cardiovascular: Negative.    Gastrointestinal: Negative.    Endocrine: Negative.    Genitourinary: Negative.    Musculoskeletal: Negative.    Skin: Negative.    Allergic/Immunologic: Negative.    Neurological: Negative.    Hematological: Negative.      No pain in the the legs and examination    Current Medications: Reviewed  Allergies: Reviewed  PMH/FH/SH:  Reviewed      Physical Exam:    Body surface area is 2.07 meters squared.    Wt Readings from Last 3 Encounters:   05/29/24 110 kg (243 lb)   01/02/24 110 kg (243 lb)   10/23/23 111 kg (244 lb)        Temp Readings from Last 3 Encounters:   05/29/24 (!) 97.4 °F (36.3 °C) (Temporal)   10/23/23 98.2 °F (36.8 °C) (Temporal)   08/22/23 (!) 97.4 °F (36.3 °C)        BP Readings from Last 3 Encounters:   05/29/24 118/72   01/02/24 130/88   10/23/23 124/86         Pulse Readings from Last 3 Encounters:   05/29/24 82   01/02/24 80   10/23/23 55     @LASTSAO2(3)@      Physical Exam  Constitutional:       Appearance: Normal appearance. She is normal weight.   HENT:      Head: Normocephalic and atraumatic.   Eyes:      Extraocular Movements: Extraocular movements intact.      Conjunctiva/sclera: Conjunctivae normal.      Pupils: Pupils are equal, round, and reactive to light.   Cardiovascular:      Rate and Rhythm: Normal rate and regular rhythm.      Heart sounds: Normal heart sounds.   Pulmonary:      Effort: Pulmonary effort is normal.      Breath sounds: Normal breath sounds.   Abdominal:      General: Abdomen is flat. Bowel sounds are normal.      Palpations: Abdomen is soft.   Musculoskeletal:         General: Normal range of motion.      Cervical back: Normal range of motion and neck supple.   Skin:     General: Skin is warm and dry.   Neurological:      General: No " focal deficit present.      Mental Status: She is alert and oriented to person, place, and time. Mental status is at baseline.           Goals and Barriers:  Current Goal: Prolong Survival from Cancer.   Barriers: None.      Patient's Capacity to Self Care:  Patient is able to self care.    Code Status: [unfilled]  Advance Directive and Living Will:      Power of :

## 2024-08-22 ENCOUNTER — REMOTE DEVICE CLINIC VISIT (OUTPATIENT)
Dept: CARDIOLOGY CLINIC | Facility: CLINIC | Age: 74
End: 2024-08-22
Payer: MEDICARE

## 2024-08-22 DIAGNOSIS — Z95.0 CARDIAC PACEMAKER IN SITU: Primary | ICD-10-CM

## 2024-08-22 PROCEDURE — 93296 REM INTERROG EVL PM/IDS: CPT | Performed by: INTERNAL MEDICINE

## 2024-08-22 PROCEDURE — 93294 REM INTERROG EVL PM/LDLS PM: CPT | Performed by: INTERNAL MEDICINE

## 2024-08-22 NOTE — PROGRESS NOTES
"Results for orders placed or performed in visit on 08/22/24   Cardiac EP device report    Narrative    MDT DC PM/NOT MRI CONDITIONAL  CARELINK TRANSMISSION: BATTERY STATUS \"11 YRS.\" AP 5%  2%. ALL AVAILABLE LEAD PARAMETERS WITHIN NORMAL LIMITS. NO SIGNIFICANT HIGH RATE EPISODES. NORMAL DEVICE FUNCTION. NC         "

## 2024-10-24 ENCOUNTER — HOSPITAL ENCOUNTER (EMERGENCY)
Facility: HOSPITAL | Age: 74
Discharge: HOME | End: 2024-10-24
Attending: EMERGENCY MEDICINE
Payer: MEDICARE

## 2024-10-24 ENCOUNTER — APPOINTMENT (EMERGENCY)
Dept: RADIOLOGY | Facility: HOSPITAL | Age: 74
End: 2024-10-24
Payer: MEDICARE

## 2024-10-24 VITALS
HEART RATE: 91 BPM | DIASTOLIC BLOOD PRESSURE: 66 MMHG | OXYGEN SATURATION: 99 % | SYSTOLIC BLOOD PRESSURE: 133 MMHG | TEMPERATURE: 98.1 F | RESPIRATION RATE: 18 BRPM

## 2024-10-24 DIAGNOSIS — R07.89 CHEST HEAVINESS: Primary | ICD-10-CM

## 2024-10-24 LAB
ALBUMIN SERPL BCG-MCNC: 3.8 G/DL (ref 3.5–5)
ALP SERPL-CCNC: 59 U/L (ref 34–104)
ALT SERPL W P-5'-P-CCNC: 5 U/L (ref 7–52)
ANION GAP SERPL CALCULATED.3IONS-SCNC: 6 MMOL/L (ref 4–13)
AST SERPL W P-5'-P-CCNC: 13 U/L (ref 13–39)
BASOPHILS # BLD AUTO: 0.04 THOUSANDS/ΜL (ref 0–0.1)
BASOPHILS NFR BLD AUTO: 0 % (ref 0–1)
BILIRUB SERPL-MCNC: 0.56 MG/DL (ref 0.2–1)
BNP SERPL-MCNC: 65 PG/ML (ref 0–100)
BUN SERPL-MCNC: 17 MG/DL (ref 5–25)
CALCIUM SERPL-MCNC: 9.6 MG/DL (ref 8.4–10.2)
CARDIAC TROPONIN I PNL SERPL HS: 3 NG/L
CHLORIDE SERPL-SCNC: 102 MMOL/L (ref 96–108)
CO2 SERPL-SCNC: 32 MMOL/L (ref 21–32)
CREAT SERPL-MCNC: 0.95 MG/DL (ref 0.6–1.3)
D DIMER PPP FEU-MCNC: <0.27 UG/ML FEU
EOSINOPHIL # BLD AUTO: 0.12 THOUSAND/ΜL (ref 0–0.61)
EOSINOPHIL NFR BLD AUTO: 1 % (ref 0–6)
ERYTHROCYTE [DISTWIDTH] IN BLOOD BY AUTOMATED COUNT: 14 % (ref 11.6–15.1)
FLUAV AG UPPER RESP QL IA.RAPID: NEGATIVE
FLUBV AG UPPER RESP QL IA.RAPID: NEGATIVE
GFR SERPL CREATININE-BSD FRML MDRD: 59 ML/MIN/1.73SQ M
GLUCOSE SERPL-MCNC: 144 MG/DL (ref 65–140)
HCT VFR BLD AUTO: 39.2 % (ref 34.8–46.1)
HGB BLD-MCNC: 12.5 G/DL (ref 11.5–15.4)
IMM GRANULOCYTES # BLD AUTO: 0.04 THOUSAND/UL (ref 0–0.2)
IMM GRANULOCYTES NFR BLD AUTO: 0 % (ref 0–2)
LYMPHOCYTES # BLD AUTO: 1.73 THOUSANDS/ΜL (ref 0.6–4.47)
LYMPHOCYTES NFR BLD AUTO: 17 % (ref 14–44)
MCH RBC QN AUTO: 29.1 PG (ref 26.8–34.3)
MCHC RBC AUTO-ENTMCNC: 31.9 G/DL (ref 31.4–37.4)
MCV RBC AUTO: 91 FL (ref 82–98)
MONOCYTES # BLD AUTO: 0.7 THOUSAND/ΜL (ref 0.17–1.22)
MONOCYTES NFR BLD AUTO: 7 % (ref 4–12)
NEUTROPHILS # BLD AUTO: 7.49 THOUSANDS/ΜL (ref 1.85–7.62)
NEUTS SEG NFR BLD AUTO: 75 % (ref 43–75)
NRBC BLD AUTO-RTO: 0 /100 WBCS
PLATELET # BLD AUTO: 224 THOUSANDS/UL (ref 149–390)
PMV BLD AUTO: 9.7 FL (ref 8.9–12.7)
POTASSIUM SERPL-SCNC: 3.7 MMOL/L (ref 3.5–5.3)
PROT SERPL-MCNC: 6.5 G/DL (ref 6.4–8.4)
RBC # BLD AUTO: 4.29 MILLION/UL (ref 3.81–5.12)
SARS-COV+SARS-COV-2 AG RESP QL IA.RAPID: NEGATIVE
SODIUM SERPL-SCNC: 140 MMOL/L (ref 135–147)
WBC # BLD AUTO: 10.12 THOUSAND/UL (ref 4.31–10.16)

## 2024-10-24 PROCEDURE — 87804 INFLUENZA ASSAY W/OPTIC: CPT | Performed by: EMERGENCY MEDICINE

## 2024-10-24 PROCEDURE — 85025 COMPLETE CBC W/AUTO DIFF WBC: CPT | Performed by: EMERGENCY MEDICINE

## 2024-10-24 PROCEDURE — 93005 ELECTROCARDIOGRAM TRACING: CPT

## 2024-10-24 PROCEDURE — 84484 ASSAY OF TROPONIN QUANT: CPT | Performed by: EMERGENCY MEDICINE

## 2024-10-24 PROCEDURE — 87811 SARS-COV-2 COVID19 W/OPTIC: CPT | Performed by: EMERGENCY MEDICINE

## 2024-10-24 PROCEDURE — 36415 COLL VENOUS BLD VENIPUNCTURE: CPT | Performed by: EMERGENCY MEDICINE

## 2024-10-24 PROCEDURE — 99285 EMERGENCY DEPT VISIT HI MDM: CPT | Performed by: EMERGENCY MEDICINE

## 2024-10-24 PROCEDURE — 85379 FIBRIN DEGRADATION QUANT: CPT | Performed by: EMERGENCY MEDICINE

## 2024-10-24 PROCEDURE — 83880 ASSAY OF NATRIURETIC PEPTIDE: CPT | Performed by: EMERGENCY MEDICINE

## 2024-10-24 PROCEDURE — 80053 COMPREHEN METABOLIC PANEL: CPT | Performed by: EMERGENCY MEDICINE

## 2024-10-24 PROCEDURE — 99285 EMERGENCY DEPT VISIT HI MDM: CPT

## 2024-10-24 PROCEDURE — 71045 X-RAY EXAM CHEST 1 VIEW: CPT

## 2024-10-24 RX ORDER — ACETAMINOPHEN 325 MG/1
975 TABLET ORAL ONCE
Status: COMPLETED | OUTPATIENT
Start: 2024-10-24 | End: 2024-10-24

## 2024-10-24 RX ADMIN — ACETAMINOPHEN 975 MG: 325 TABLET ORAL at 16:00

## 2024-10-24 NOTE — ED PROVIDER NOTES
Time reflects when diagnosis was documented in both MDM as applicable and the Disposition within this note       Time User Action Codes Description Comment    10/24/2024  4:35 PM Petey Boland Add [R07.89] Chest heaviness           ED Disposition       ED Disposition   Discharge    Condition   Stable    Date/Time   Thu Oct 24, 2024  4:35 PM    Comment   Dayanna Ortez discharge to home/self care.                   Assessment & Plan       Medical Decision Making  73-year-old female presenting for evaluation of chest heaviness.  Has been present since last night.  Not exertional in nature.  Says that it feels worse with deep breathing.  Does have a history of DVTs many years ago is on Xarelto.  Says she is compliant on it.  Denies any lightheadedness, nausea or vomiting.  Patient has an ICD did not feel any shocks.  Vitals within normal limits  Differential includes musculoskeletal chest pain, viral URI, pneumonia, PE, ACS  Will obtain cardiac workup, D-dimer, EKG, chest x-ray.  Will interrogate pacemaker  Labs within normal limits including D-dimer.  Troponin negative at 3.  Given symptoms since last night do not believe repeat is required.  EKG shows no ischemic changes normal intervals.  Chest x-ray shows no acute cardiopulmonary disease.  Patient discharged, told to follow-up with him doctor and return if symptoms worsen    Problems Addressed:  Chest heaviness: acute illness or injury    Amount and/or Complexity of Data Reviewed  Labs: ordered.  Radiology: ordered.    Risk  OTC drugs.        ED Course as of 10/24/24 2046   Thu Oct 24, 2024   1625 Per nurse, the [employer] rep said that there were no events on the interrogation   1634 Discussed lab work and imaging with the patient.  Altered return to ED if symptoms worsen or change.       Medications   acetaminophen (TYLENOL) tablet 975 mg (975 mg Oral Given 10/24/24 1600)       ED Risk Strat Scores   HEART Risk Score      Flowsheet Row Most Recent Value   Heart  Score Risk Calculator    History 0 Filed at: 10/24/2024 1637   ECG 0 Filed at: 10/24/2024 1637   Age 2 Filed at: 10/24/2024 1637   Risk Factors 2 Filed at: 10/24/2024 1637   Troponin 0 Filed at: 10/24/2024 1637   HEART Score 4 Filed at: 10/24/2024 1637                               SBIRT 20yo+      Flowsheet Row Most Recent Value   Initial Alcohol Screen: US AUDIT-C     1. How often do you have a drink containing alcohol? 0 Filed at: 10/24/2024 1529   2. How many drinks containing alcohol do you have on a typical day you are drinking?  0 Filed at: 10/24/2024 1529   3a. Male UNDER 65: How often do you have five or more drinks on one occasion? 0 Filed at: 10/24/2024 1529   3b. FEMALE Any Age, or MALE 65+: How often do you have 4 or more drinks on one occassion? 0 Filed at: 10/24/2024 1529   Audit-C Score 0 Filed at: 10/24/2024 1529   JOHANA: How many times in the past year have you...    Used an illegal drug or used a prescription medication for non-medical reasons? Never Filed at: 10/24/2024 1529            Wells' Criteria for PE      Flowsheet Row Most Recent Value   Wells' Criteria for PE    Clinical signs and symptoms of DVT 0 Filed at: 10/24/2024 1452   PE is primary diagnosis or equally likely 3 Filed at: 10/24/2024 1452   HR >100 0 Filed at: 10/24/2024 1452   Immobilization at least 3 days or Surgery in the previous 4 weeks 0 Filed at: 10/24/2024 1452   Previous, objectively diagnosed PE or DVT 1.5 Filed at: 10/24/2024 1452   Hemoptysis 0 Filed at: 10/24/2024 1452   Malignancy with treatment within 6 months or palliative 0 Filed at: 10/24/2024 1452   Morgan Criteria Total 4.5 Filed at: 10/24/2024 1452          Wells' Criteria for DVT      Flowsheet Row Most Recent Value   Wells' Criteria for DVT    Active cancer Treatment or palliation within 6 months 0 Filed at: 10/24/2024 1452   Bedridden recently >3 days or major surgery within 12 weeks 0 Filed at: 10/24/2024 1454   Calf swelling >3 cm compared to the other  leg 0 Filed at: 10/24/2024 1452   Entire leg swollen 0 Filed at: 10/24/2024 1452   Collateral (nonvaricose) superficial veins present 0 Filed at: 10/24/2024 1452   Localized tenderness along the deep venous system --   Pitting edema, confined to symptomatic leg --   Paralysis, paresis, or recent plaster immobilization of the lower extremity --   Previously documented DVT --   Alternative diagnosis to DVT as likely or more likely --   Wells DVT Critera Score 0 Filed at: 10/24/2024 1452                      History of Present Illness       Chief Complaint   Patient presents with    Shortness of Breath    Chest Pain     Heaviness in chest       Past Medical History:   Diagnosis Date    Atrial fibrillation (HCC)     paroxsysmal    Diabetes mellitus (HCC)     Type 1    History of echocardiogram 08/16/2011    EF 55%, Normal study    History of nuclear stress test 03/09/2016    EF 63%, Normal.    Hyperlipidemia     Hypertension     Second degree heart block 2012    s/p dual-chamber Medtronic pacemaker 6/2012      Past Surgical History:   Procedure Laterality Date    A-V CARDIAC PACEMAKER INSERTION  06/2012    dual chamber Medtronic    BACK SURGERY      CARDIAC PACEMAKER PLACEMENT      CARDIAC PACEMAKER PLACEMENT Left 8/22/2023    Procedure: REMOVE AND INSERTION PACEMAKER CHANGE OUT;  Surgeon: Mikie Landeros MD;  Location: [facility];  Service: Cardiology    TONSILLECTOMY        Family History   Problem Relation Age of Onset    Cervical cancer Mother     Leukemia Father     Diabetes Brother       Social History     Tobacco Use    Smoking status: Never    Smokeless tobacco: Never   Vaping Use    Vaping status: Never Used   Substance Use Topics    Alcohol use: No    Drug use: No      E-Cigarette/Vaping    E-Cigarette Use Never User       E-Cigarette/Vaping Substances    Nicotine No     THC No     CBD No     Flavoring No     Other No     Unknown No       I have reviewed and agree with the history as documented.     Patient  is a 73-year-old female with a history of chronic DVTs, who presents for evaluation of chest heaviness.  Patient says the symptoms started yesterday and got progressively worse.  She says the heaviness stays in the center of her chest and does not radiate.  It is not exertional.  She says the pain is worse with deep breathing.  She denies any lightheadedness or dizziness.  Denies any increased leg swelling or leg pain.  She says she has been compliant on her Xarelto.  Denies any cough, fevers or chills.        Review of Systems   Constitutional:  Negative for fever and unexpected weight change.   HENT:  Negative for congestion, ear pain, sore throat and trouble swallowing.    Eyes:  Negative for pain and redness.   Respiratory:  Positive for chest tightness. Negative for cough and shortness of breath.    Cardiovascular:  Negative for chest pain and leg swelling.   Gastrointestinal:  Negative for abdominal distention, abdominal pain, diarrhea and vomiting.   Endocrine: Negative for polyuria.   Genitourinary:  Negative for dysuria, hematuria, pelvic pain and vaginal bleeding.   Musculoskeletal:  Negative for back pain and myalgias.   Skin:  Negative for color change and rash.   Neurological:  Negative for dizziness, syncope, weakness, light-headedness and headaches.           Objective       ED Triage Vitals   Temperature Pulse Blood Pressure Respirations SpO2 Patient Position - Orthostatic VS   10/24/24 1447 10/24/24 1446 10/24/24 1447 10/24/24 1446 10/24/24 1446 --   98.1 °F (36.7 °C) 91 133/66 18 99 %       Temp src Heart Rate Source BP Location FiO2 (%) Pain Score    -- 10/24/24 1446 -- -- 10/24/24 1446     Monitor   No Pain      Vitals      Date and Time Temp Pulse SpO2 Resp BP Pain Score FACES Pain Rating User   10/24/24 1447 98.1 °F (36.7 °C) -- -- -- 133/66 -- -- KB   10/24/24 1446 -- 91 99 % 18 -- No Pain -- JCS            Physical Exam  Vitals and nursing note reviewed.   Constitutional:       General: She  is not in acute distress.     Appearance: She is well-developed.   HENT:      Head: Normocephalic and atraumatic.      Right Ear: External ear normal.      Left Ear: External ear normal.      Nose: Nose normal.      Mouth/Throat:      Mouth: Mucous membranes are moist.      Pharynx: No oropharyngeal exudate.   Eyes:      Conjunctiva/sclera: Conjunctivae normal.      Pupils: Pupils are equal, round, and reactive to light.   Cardiovascular:      Rate and Rhythm: Normal rate and regular rhythm.      Heart sounds: Normal heart sounds. No murmur heard.     No friction rub. No gallop.   Pulmonary:      Effort: Pulmonary effort is normal. No respiratory distress.      Breath sounds: Normal breath sounds. No wheezing or rales.   Abdominal:      General: There is no distension.      Palpations: Abdomen is soft.      Tenderness: There is no abdominal tenderness. There is no guarding.   Musculoskeletal:         General: No swelling, tenderness or deformity. Normal range of motion.      Cervical back: Normal range of motion and neck supple.   Lymphadenopathy:      Cervical: No cervical adenopathy.   Skin:     General: Skin is warm and dry.   Neurological:      General: No focal deficit present.      Mental Status: She is alert and oriented to person, place, and time. Mental status is at baseline.      Cranial Nerves: No cranial nerve deficit.      Sensory: No sensory deficit.      Motor: No weakness or abnormal muscle tone.      Coordination: Coordination normal.         Results Reviewed       Procedure Component Value Units Date/Time    FLU/COVID Rapid Antigen (30 min. TAT) - Preferred screening test in ED [[id number]]  (Normal) Collected: 10/24/24 1509    Lab Status: Final result Specimen: Nares from Nose Updated: 10/24/24 1539     SARS COV Rapid Antigen Negative     Influenza A Rapid Antigen Negative     Influenza B Rapid Antigen Negative    Narrative:      This test has been performed using the Quidel Syeda 2 FLU+SARS  Antigen test under the Emergency Use Authorization (EUA). This test has been validated by the  and verified by the performing laboratory. The Syeda uses lateral flow immunofluorescent sandwich assay to detect SARS-COV, Influenza A and Influenza B Antigen.     The CDC Softwareidel Syeda 2 SARS Antigen test does not differentiate between SARS-CoV and SARS-CoV-2.     Negative results are presumptive and may be confirmed with a molecular assay, if necessary, for patient management. Negative results do not rule out SARS-CoV-2 or influenza infection and should not be used as the sole basis for treatment or patient management decisions. A negative test result may occur if the level of antigen in a sample is below the limit of detection of this test.     Positive results are indicative of the presence of viral antigens, but do not rule out bacterial infection or co-infection with other viruses.     All test results should be used as an adjunct to clinical observations and other information available to the provider.    FOR PEDIATRIC PATIENTS - copy/paste COVID Guidelines URL to browser: [email or web address]    HS Troponin 0hr (reflex protocol) [[id number]]  (Normal) Collected: 10/24/24 1509    Lab Status: Final result Specimen: Blood from Arm, Left Updated: 10/24/24 1537     hs TnI 0hr 3 ng/L     B-Type Natriuretic Peptide(BNP) [[id number]]  (Normal) Collected: 10/24/24 1509    Lab Status: Final result Specimen: Blood from Arm, Left Updated: 10/24/24 1537     BNP 65 pg/mL     Comprehensive metabolic panel [[id number]]  (Abnormal) Collected: 10/24/24 1509    Lab Status: Final result Specimen: Blood from Arm, Left Updated: 10/24/24 1534     Sodium 140 mmol/L      Potassium 3.7 mmol/L      Chloride 102 mmol/L      CO2 32 mmol/L      ANION GAP 6 mmol/L      BUN 17 mg/dL      Creatinine 0.95 mg/dL      Glucose 144 mg/dL      Calcium 9.6 mg/dL      AST 13 U/L      ALT 5 U/L       Alkaline Phosphatase 59 U/L      Total Protein 6.5 g/dL      Albumin 3.8 g/dL      Total Bilirubin 0.56 mg/dL      eGFR 59 ml/min/1.73sq m     Narrative:      National Kidney Disease Foundation guidelines for Chronic Kidney Disease (CKD):     Stage 1 with normal or high GFR (GFR > 90 mL/min/1.73 square meters)    Stage 2 Mild CKD (GFR = 60-89 mL/min/1.73 square meters)    Stage 3A Moderate CKD (GFR = 45-59 mL/min/1.73 square meters)    Stage 3B Moderate CKD (GFR = 30-44 mL/min/1.73 square meters)    Stage 4 Severe CKD (GFR = 15-29 mL/min/1.73 square meters)    Stage 5 End Stage CKD (GFR <15 mL/min/1.73 square meters)  Note: GFR calculation is accurate only with a steady state creatinine    D-dimer, quantitative [[id number]]  (Normal) Collected: 10/24/24 1509    Lab Status: Final result Specimen: Blood from Arm, Left Updated: 10/24/24 1530     D-Dimer, Quant <0.27 ug/ml FEU     Narrative:      In the evaluation for possible pulmonary embolism, in the appropriate (Well's Score of 4 or less) patient, the age adjusted d-dimer cutoff for this patient can be calculated as:    Age x 0.01 (in ug/mL) for Age-adjusted D-dimer exclusion threshold for a patient over 50 years.    CBC and differential [[id number]] Collected: 10/24/24 1509    Lab Status: Final result Specimen: Blood from Arm, Left Updated: 10/24/24 1515     WBC 10.12 Thousand/uL      RBC 4.29 Million/uL      Hemoglobin 12.5 g/dL      Hematocrit 39.2 %      MCV 91 fL      MCH 29.1 pg      MCHC 31.9 g/dL      RDW 14.0 %      MPV 9.7 fL      Platelets 224 Thousands/uL      nRBC 0 /100 WBCs      Segmented % 75 %      Immature Grans % 0 %      Lymphocytes % 17 %      Monocytes % 7 %      Eosinophils Relative 1 %      Basophils Relative 0 %      Absolute Neutrophils 7.49 Thousands/µL      Absolute Immature Grans 0.04 Thousand/uL      Absolute Lymphocytes 1.73 Thousands/µL      Absolute Monocytes 0.70 Thousand/µL      Eosinophils Absolute 0.12 Thousand/µL      Basophils  Absolute 0.04 Thousands/µL             XR chest 1 view portable   Final Interpretation by Noé Vallecillo MD (10/24 3152)      No acute cardiopulmonary disease.            Workstation performed: [id number]             ECG 12 Lead Documentation Only    Date/Time: 10/24/2024 3:07 PM    Performed by: Petey Boland DO  Authorized by: Petey Boland DO    Indications / Diagnosis:  Chest heaviness  ECG reviewed by me, the ED Provider: yes    Patient location:  ED  Previous ECG:     Previous ECG:  Compared to current    Similarity:  No change    Comparison to cardiac monitor: Yes    Interpretation:     Interpretation: abnormal    Rate:     ECG rate:  93    ECG rate assessment: normal    Rhythm:     Rhythm: sinus rhythm    Ectopy:     Ectopy: none    QRS:     QRS axis:  Normal  Conduction:     Conduction: normal    ST segments:     ST segments:  Normal  T waves:     T waves: normal        ED Medication and Procedure Management   Prior to Admission Medications   Prescriptions Last Dose Informant Patient Reported? Taking?   Soliqua 100-33 UNT-MCG/ML injection pen  Self Yes No   Sig: INJECTS 33 UNITS AT BEDTIME   STOP TOUJEO   Xarelto 20 MG tablet  Self Yes No   Sig: Take 20 mg by mouth daily   atorvastatin (LIPITOR) 10 mg tablet  Self Yes No   Sig: Take 10 mg by mouth daily   cholecalciferol (VITAMIN D3) 1,000 units tablet  Self Yes No   Sig: Take 1,000 Units by mouth daily   glipiZIDE (GLUCOTROL) 5 mg tablet  Self Yes No   Sig: TAKE 1 TABLET BY MOUTH TWICE A DAY  BEFORE BREAKFAST AND SUPPER (2 TABLETS TOTAL DAILY)   hydrochlorothiazide (HYDRODIURIL) 25 mg tablet  Self Yes No   Sig: Take 25 mg by mouth daily   levothyroxine 100 mcg tablet  Self Yes No   Sig: Take 100 mcg by mouth daily   levothyroxine 150 mcg tablet  Self Yes No   Sig: Take 175 mcg by mouth daily   Patient not taking: Reported on 5/29/2024   levothyroxine 75 mcg tablet  Self Yes No   Sig: Take 75 mcg by mouth daily in the early morning   lisinopril  (ZESTRIL) 2.5 mg tablet  Self Yes No   Sig: Take 2.5 mg by mouth daily   metoprolol succinate (TOPROL-XL) 50 mg 24 hr tablet  Self Yes No   Sig: Take 50 mg by mouth 2 (two) times a day   rosuvastatin (CRESTOR) 5 mg tablet  Self Yes No   Sig: Take 5 mg by mouth daily   semaglutide, 0.25 or 0.5 mg/dose, (Ozempic, 0.25 or 0.5 MG/DOSE,) 2 mg/3 mL injection pen  Self Yes No   Sig: Inject 0.5 mg under the skin   traMADol (ULTRAM) 50 mg tablet  Self Yes No   Sig: TAKE ONE (1) TABLET BY MOUTH DAILY AS NEEDED      Facility-Administered Medications: None     Discharge Medication List as of 10/24/2024  4:48 PM        CONTINUE these medications which have NOT CHANGED    Details   atorvastatin (LIPITOR) 10 mg tablet Take 10 mg by mouth daily, Starting Fri 12/1/2023, Historical Med      cholecalciferol (VITAMIN D3) 1,000 units tablet Take 1,000 Units by mouth daily, Historical Med      glipiZIDE (GLUCOTROL) 5 mg tablet TAKE 1 TABLET BY MOUTH TWICE A DAY  BEFORE BREAKFAST AND SUPPER (2 TABLETS TOTAL DAILY), Historical Med      hydrochlorothiazide (HYDRODIURIL) 25 mg tablet Take 25 mg by mouth daily, Historical Med      !! levothyroxine 100 mcg tablet Take 100 mcg by mouth daily, Starting Fri 12/1/2023, Historical Med      !! levothyroxine 150 mcg tablet Take 175 mcg by mouth daily, Historical Med      !! levothyroxine 75 mcg tablet Take 75 mcg by mouth daily in the early morning, Starting Wed 10/4/2023, Historical Med      lisinopril (ZESTRIL) 2.5 mg tablet Take 2.5 mg by mouth daily, Starting Fri 8/4/2023, Historical Med      metoprolol succinate (TOPROL-XL) 50 mg 24 hr tablet Take 50 mg by mouth 2 (two) times a day, Starting Tue 4/29/2008, Historical Med      rosuvastatin (CRESTOR) 5 mg tablet Take 5 mg by mouth daily, Historical Med      semaglutide, 0.25 or 0.5 mg/dose, (Ozempic, 0.25 or 0.5 MG/DOSE,) 2 mg/3 mL injection pen Inject 0.5 mg under the skin, Starting Fri 8/4/2023, Historical Med      Soliqua 100-33 UNT-MCG/ML  injection pen INJECTS 33 UNITS AT BEDTIME   STOP TOUJEO, Historical Med      traMADol (ULTRAM) 50 mg tablet TAKE ONE (1) TABLET BY MOUTH DAILY AS NEEDED, Historical Med      Xarelto 20 MG tablet Take 20 mg by mouth daily, Starting Thu 8/3/2023, Historical Med       !! - Potential duplicate medications found. Please discuss with provider.        No discharge procedures on file.  ED SEPSIS DOCUMENTATION   Time reflects when diagnosis was documented in both MDM as applicable and the Disposition within this note       Time User Action Codes Description Comment    10/24/2024  4:35 PM Petey Boland Add [R07.89] Chest heaviness                  Petey Boland, DO  10/24/24 2046

## 2024-10-25 LAB
ATRIAL RATE: 93 BPM
P AXIS: 46 DEGREES
PR INTERVAL: 184 MS
QRS AXIS: 3 DEGREES
QRSD INTERVAL: 90 MS
QT INTERVAL: 334 MS
QTC INTERVAL: 415 MS
T WAVE AXIS: 32 DEGREES
VENTRICULAR RATE: 93 BPM

## 2024-10-25 PROCEDURE — 93010 ELECTROCARDIOGRAM REPORT: CPT | Performed by: INTERNAL MEDICINE

## 2024-11-13 ENCOUNTER — RESULTS FOLLOW-UP (OUTPATIENT)
Dept: NON INVASIVE DIAGNOSTICS | Facility: HOSPITAL | Age: 74
End: 2024-11-13

## 2024-11-13 ENCOUNTER — TELEPHONE (OUTPATIENT)
Dept: CARDIOLOGY CLINIC | Facility: CLINIC | Age: 74
End: 2024-11-13

## 2024-11-13 DIAGNOSIS — I47.20 VT (VENTRICULAR TACHYCARDIA) (HCC): Primary | ICD-10-CM

## 2024-11-13 NOTE — RESULT ENCOUNTER NOTE
Device information reviewed.   13 seconds of VT seen.  Looking through the records she had a similar spell on all monitor report from [facility] in the past.    I would like patient to have an echocardiogram and then a visit with me.  Please review with patient and arrange.

## 2024-11-13 NOTE — TELEPHONE ENCOUNTER
Spoke with patient.   Echocardiogram arranged for 11/18/24 at 9:00 am in [place] and f/u with Dr Landeros for 12/4/24 at 9:00 am in [place].    Mikie Landeros MD  P  [facility]  Device information reviewed.  13 seconds of VT seen.  Looking through the records she had a similar spell on all monitor report from [facility] in the past.    I would like patient to have an echocardiogram and then a visit with me.  Please review with patient and arrange.

## 2024-11-18 ENCOUNTER — HOSPITAL ENCOUNTER (OUTPATIENT)
Dept: NON INVASIVE DIAGNOSTICS | Facility: CLINIC | Age: 74
Discharge: HOME | End: 2024-11-18
Payer: MEDICARE

## 2024-11-18 VITALS
WEIGHT: 243 LBS | SYSTOLIC BLOOD PRESSURE: 133 MMHG | HEIGHT: 62 IN | BODY MASS INDEX: 44.72 KG/M2 | DIASTOLIC BLOOD PRESSURE: 66 MMHG | HEART RATE: 84 BPM

## 2024-11-18 DIAGNOSIS — I47.20 VT (VENTRICULAR TACHYCARDIA) (HCC): ICD-10-CM

## 2024-11-18 LAB
AORTIC ROOT: 2.5 CM
AORTIC VALVE MEAN VELOCITY: 6 M/S
APICAL FOUR CHAMBER EJECTION FRACTION: 55 %
ASCENDING AORTA: 3 CM
AV AREA BY CONTINUOUS VTI: 3 CM2
AV AREA PEAK VELOCITY: 3.2 CM2
AV LVOT MEAN GRADIENT: 2 MMHG
AV LVOT PEAK GRADIENT: 3 MMHG
AV MEAN GRADIENT: 2 MMHG
AV PEAK GRADIENT: 4 MMHG
AV VALVE AREA: 2.98 CM2
AV VELOCITY RATIO: 0.85
BSA FOR ECHO PROCEDURE: 2.08 M2
DOP CALC AO PEAK VEL: 1.04 M/S
DOP CALC AO VTI: 21.13 CM
DOP CALC LVOT AREA: 3.8 CM2
DOP CALC LVOT CARDIAC INDEX: 2.56 L/MIN/M2
DOP CALC LVOT CARDIAC OUTPUT: 5.32 L/MIN
DOP CALC LVOT DIAMETER: 2.2 CM
DOP CALC LVOT PEAK VEL VTI: 16.59 CM
DOP CALC LVOT PEAK VEL: 0.88 M/S
DOP CALC LVOT STROKE INDEX: 30.8 ML/M2
DOP CALC LVOT STROKE VOLUME: 63.03
FRACTIONAL SHORTENING: 33 (ref 28–44)
INTERVENTRICULAR SEPTUM IN DIASTOLE (PARASTERNAL SHORT AXIS VIEW): 1.1 CM
INTERVENTRICULAR SEPTUM: 1.1 CM (ref 0.6–1.1)
LAAS-AP2: 21.1 CM2
LAAS-AP4: 15.8 CM2
LEFT ATRIUM SIZE: 3.2 CM
LEFT ATRIUM VOLUME (MOD BIPLANE): 52 ML
LEFT ATRIUM VOLUME INDEX (MOD BIPLANE): 25 ML/M2
LEFT INTERNAL DIMENSION IN SYSTOLE: 2.7 CM (ref 2.1–4)
LEFT VENTRICULAR INTERNAL DIMENSION IN DIASTOLE: 4 CM (ref 3.5–6)
LEFT VENTRICULAR POSTERIOR WALL IN END DIASTOLE: 1 CM
LEFT VENTRICULAR STROKE VOLUME: 44 ML
LVSV (TEICH): 44 ML
RIGHT ATRIUM AREA SYSTOLE A4C: 9 CM2
RIGHT VENTRICLE ID DIMENSION: 2.6 CM
SL CV LEFT ATRIUM LENGTH A2C: 5.2 CM
SL CV LV EF: 55
SL CV PED ECHO LEFT VENTRICLE DIASTOLIC VOLUME (MOD BIPLANE) 2D: 71 ML
SL CV PED ECHO LEFT VENTRICLE SYSTOLIC VOLUME (MOD BIPLANE) 2D: 28 ML
TR MAX PG: 23 MMHG
TR PEAK VELOCITY: 2.4 M/S
TRICUSPID ANNULAR PLANE SYSTOLIC EXCURSION: 2 CM
TRICUSPID VALVE PEAK REGURGITATION VELOCITY: 2.39 M/S

## 2024-11-18 PROCEDURE — 93306 TTE W/DOPPLER COMPLETE: CPT | Performed by: INTERNAL MEDICINE

## 2024-11-18 PROCEDURE — 93306 TTE W/DOPPLER COMPLETE: CPT

## 2024-12-02 ENCOUNTER — OFFICE VISIT (OUTPATIENT)
Dept: HEMATOLOGY ONCOLOGY | Facility: CLINIC | Age: 74
End: 2024-12-02
Payer: MEDICARE

## 2024-12-02 VITALS
OXYGEN SATURATION: 99 % | SYSTOLIC BLOOD PRESSURE: 118 MMHG | BODY MASS INDEX: 41.59 KG/M2 | HEIGHT: 62 IN | DIASTOLIC BLOOD PRESSURE: 72 MMHG | HEART RATE: 76 BPM | TEMPERATURE: 97.7 F | WEIGHT: 226 LBS | RESPIRATION RATE: 18 BRPM

## 2024-12-02 DIAGNOSIS — Z79.01 LONG TERM (CURRENT) USE OF ANTICOAGULANTS: ICD-10-CM

## 2024-12-02 DIAGNOSIS — I82.401 RECURRENT ACUTE DEEP VEIN THROMBOSIS (DVT) OF RIGHT LOWER EXTREMITY (HCC): Primary | ICD-10-CM

## 2024-12-02 PROCEDURE — 99212 OFFICE O/P EST SF 10 MIN: CPT | Performed by: PHYSICIAN ASSISTANT

## 2024-12-02 NOTE — PROGRESS NOTES
[facility] HEMATOLOGY ONCOLOGY SPECIALISTS [place]  [address]  [facility] [place] [address]  Hematology Ambulatory Follow-Up  Dayanna Ortez, 1950, [id number]  12/2/2024      Assessment and Plan   There are no diagnoses linked to this encounter.    74-year-old female with history of atrial fibrillation who presents as follow-up for right lower extremity DVT.  Thought to be unprovoked.  She had 1 DVT prior to this again in her right lower extremity that was thought to be unprovoked.  She was recommended lifelong anticoagulation.  Recent CBC, CMP unremarkable.    Recurrent VTE  I agree with recommendation for lifelong anticoagulation given history of recurrent (unprovoked) VTE.   Continue 20 mg Xarelto daily as long as her bleeding risk remains low and medication is affordable.  Would continue therapeutic dosing at this time given her history of proximal atrial fibrillation.  Patient will be referred back to PCP.  She may return to our office on an as-needed basis if her clinical situation changes (ie mod-high bleeding risk, frequent falls etc) requiring new recommendations for anticoagulation therapy.     RTC prn     Patient voiced agreement and understanding to the above.   Patient advised to call the Hematology/Oncology office with any questions and concerns regarding the above.    Barrier(s) to care: None  The patient is able to self care.    Erlinda Payne PA-C   Medical Oncology/Hematology  [facility]    Subjective   No chief complaint on file.      History of present illness: 74-year-old female with history of DVT who presents as follow-up.      She was previously seen by Dr. Nam, Dr. Moreno and Dr. Garcia.  Summarized history as below:  Found to have acute DVT in right lower extremity involving the right common femoral vein, popliteal and gastroc veins.  In 07/06/2023.  She initially was started on Xarelto 15 mg twice daily and then  "transition to Xarelto 20 mg daily. She underwent repeat dopplers on 10/17/23. Compared to the study of 07/06/2023, the Right Common femoral vein thrombosis appears to have resolved. There is residual chronic, non-occlusive thrombus noted in the right popliteal vein and gastrocnemius veins.  She does report history of additional DVT in her right lower extremity approximately 2 to 3 years ago.  She was on Coumadin at that time and was taken off after a few months.    12/02/24 :  No results found for: \"IRON\", \"TIBC\", \"FERRITIN\"  Lab Results   Component Value Date    WBC 10.12 10/24/2024    HGB 12.5 10/24/2024    HCT 39.2 10/24/2024    MCV 91 10/24/2024     10/24/2024       Interval history:  She lost 35lb, working on bringing down her glucose  No blood in stool, melena or hematuria. No vaginal bleeding or epistaxis.      Review of Systems   All other systems reviewed and are negative.      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    Hyperlipidemia     Hypertension     Second degree heart block 2012    s/p dual-chamber Medtronic pacemaker 6/2012     Past Surgical History:   Procedure Laterality Date    A-V CARDIAC PACEMAKER INSERTION  06/2012    dual chamber Medtronic    BACK SURGERY      CARDIAC " PACEMAKER PLACEMENT      CARDIAC PACEMAKER PLACEMENT Left 8/22/2023    Procedure: REMOVE AND INSERTION PACEMAKER CHANGE OUT;  Surgeon: Mikie Landeros MD;  Location: [facility];  Service: Cardiology    TONSILLECTOMY       Family History   Problem Relation Age of Onset    Cervical cancer Mother     Leukemia Father     Diabetes Brother      Social History     Socioeconomic History    Marital status:      Spouse name: None    Number of children: None    Years of education: None    Highest education level: None   Occupational History    None   Tobacco Use    Smoking status: Never    Smokeless tobacco: Never   Vaping Use    Vaping status: Never Used   Substance and Sexual Activity    Alcohol use: No    Drug use: No    Sexual activity: None   Other Topics Concern    None   Social History Narrative    None     Social Drivers of Health     Financial Resource Strain: Not on file   Food Insecurity: Not on file   Transportation Needs: Not on file   Physical Activity: Not on file   Stress: Not on file   Social Connections: Unknown (6/18/2024)    Received from [place]     How often do you feel lonely or isolated from those around you? (Adult - for ages 18 years and over): Not on file   Intimate Partner Violence: Not on file   Housing Stability: Not on file       Current Outpatient Medications:     atorvastatin (LIPITOR) 10 mg tablet, Take 10 mg by mouth daily, Disp: , Rfl:     cholecalciferol (VITAMIN D3) 1,000 units tablet, Take 1,000 Units by mouth daily, Disp: , Rfl:     glipiZIDE (GLUCOTROL) 5 mg tablet, TAKE 1 TABLET BY MOUTH TWICE A DAY  BEFORE BREAKFAST AND SUPPER (2 TABLETS TOTAL DAILY), Disp: , Rfl:     hydrochlorothiazide (HYDRODIURIL) 25 mg tablet, Take 25 mg by mouth daily, Disp: , Rfl:     levothyroxine 100 mcg tablet, Take 100 mcg by mouth daily, Disp: , Rfl:     levothyroxine 150 mcg tablet, Take 175 mcg by mouth daily (Patient not taking: Reported on 5/29/2024), Disp: , Rfl:      "levothyroxine 75 mcg tablet, Take 75 mcg by mouth daily in the early morning, Disp: , Rfl:     lisinopril (ZESTRIL) 2.5 mg tablet, Take 2.5 mg by mouth daily, Disp: , Rfl:     metoprolol succinate (TOPROL-XL) 50 mg 24 hr tablet, Take 50 mg by mouth 2 (two) times a day, Disp: , Rfl:     rosuvastatin (CRESTOR) 5 mg tablet, Take 5 mg by mouth daily, Disp: , Rfl:     semaglutide, 0.25 or 0.5 mg/dose, (Ozempic, 0.25 or 0.5 MG/DOSE,) 2 mg/3 mL injection pen, Inject 0.5 mg under the skin, Disp: , Rfl:     Soliqua 100-33 UNT-MCG/ML injection pen, INJECTS 33 UNITS AT BEDTIME   STOP TOUJEO, Disp: , Rfl:     traMADol (ULTRAM) 50 mg tablet, TAKE ONE (1) TABLET BY MOUTH DAILY AS NEEDED, Disp: , Rfl:     Xarelto 20 MG tablet, Take 20 mg by mouth daily, Disp: , Rfl:   No Known Allergies    Objective   /72 (BP Location: Left arm, Patient Position: Sitting, Cuff Size: Adult)   Pulse 76   Temp 97.7 °F (36.5 °C) (Temporal)   Resp 18   Ht 5' 2\" (1.575 m)   Wt 103 kg (226 lb)   SpO2 99%   BMI 41.34 kg/m²    Physical Exam  Vitals reviewed.   HENT:      Head: Normocephalic.   Cardiovascular:      Rate and Rhythm: Normal rate and regular rhythm.   Pulmonary:      Effort: Pulmonary effort is normal.      Breath sounds: Normal breath sounds.   Abdominal:      Palpations: Abdomen is soft.      Tenderness: There is no abdominal tenderness.   Musculoskeletal:      Cervical back: Neck supple.   Skin:     Findings: No rash.   Neurological:      Mental Status: She is alert.         Result Review  Labs:  Hospital Outpatient Visit on 11/18/2024   Component Date Value Ref Range Status    BSA 11/18/2024 2.08  m2 Final    LV EF 11/18/2024 55   Final    A4C EF 11/18/2024 55  % Final    LVOT stroke volume 11/18/2024 63.03   Final    LVOT stroke volume index 11/18/2024 30.80  ml/m2 Final    LVOT Cardiac Output 11/18/2024 5.32  l/min Final    LVOT Cardiac Index 11/18/2024 2.56  l/min/m2 Final    LVIDd 11/18/2024 4.00  cm Final    LVIDS " 11/18/2024 2.70  cm Final    IVSd 11/18/2024 1.10  cm Final    LVPWd 11/18/2024 1.00  cm Final    LVOT diameter 11/18/2024 2.2  cm Final    LVOT peak VTI 11/18/2024 16.59  cm Final    FS 11/18/2024 33  28 - 44 Final    LA Volume Index (BP) 11/18/2024 25.0  mL/m2 Final    AV LVOT peak gradient 11/18/2024 3  mmHg Final    LVOT peak tal 11/18/2024 0.88  m/s Final    RVID d 11/18/2024 2.6  cm Final    Tricuspid annular plane systolic e* 11/18/2024 2.00  cm Final    LA size 11/18/2024 3.2  cm Final    LA length (A2C) 11/18/2024 5.20  cm Final    LA volume (BP) 11/18/2024 52  mL Final    RAA A4C 11/18/2024 9  cm2 Final    Aortic valve peak velocity 11/18/2024 1.04  m/s Final    Ao VTI 11/18/2024 21.13  cm Final    AV mean gradient 11/18/2024 2  mmHg Final    LVOT mn grad 11/18/2024 2.0  mmHg Final    AV peak gradient 11/18/2024 4  mmHg Final    AV area by cont VTI 11/18/2024 3.0  cm2 Final    AV area peak tal 11/18/2024 3.2  cm2 Final    TR Peak Tal 11/18/2024 2.4  m/s Final    Triscuspid Valve Regurgitation Pea* 11/18/2024 23.0  mmHg Final    Ao root 11/18/2024 2.50  cm Final    Asc Ao 11/18/2024 3  cm Final    Aortic valve mean velocity 11/18/2024 6.00  m/s Final    Tricuspid valve peak regurgitation* 11/18/2024 2.39  m/s Final    Left ventricular stroke volume (2D) 11/18/2024 44.00  mL Final    IVS 11/18/2024 1.1  cm Final    LEFT VENTRICLE SYSTOLIC VOLUME (MO* 11/18/2024 28  mL Final    LV DIASTOLIC VOLUME (MOD BIPLANE) * 11/18/2024 71  mL Final    Left Atrium Area-systolic Four Erika* 11/18/2024 15.8  cm2 Final    Left Atrium Area-systolic Apical T* 11/18/2024 21.1  cm2 Final    LVSV, 2D 11/18/2024 44  mL Final    LVOT area 11/18/2024 3.80  cm2 Final    DVI 11/18/2024 0.85   Final    AV valve area 11/18/2024 2.98  cm2 Final       Imaging:   I reviewed relevant imaging    Please note:  This report has been generated by a voice recognition software system. Therefore there may be syntax, spelling, and/or grammatical  errors. Please call if you have any questions.

## 2024-12-04 ENCOUNTER — OFFICE VISIT (OUTPATIENT)
Dept: CARDIOLOGY CLINIC | Facility: CLINIC | Age: 74
End: 2024-12-04
Payer: MEDICARE

## 2024-12-04 VITALS
WEIGHT: 223 LBS | SYSTOLIC BLOOD PRESSURE: 122 MMHG | HEIGHT: 62 IN | HEART RATE: 80 BPM | BODY MASS INDEX: 41.04 KG/M2 | DIASTOLIC BLOOD PRESSURE: 70 MMHG

## 2024-12-04 DIAGNOSIS — I10 BENIGN ESSENTIAL HTN: ICD-10-CM

## 2024-12-04 DIAGNOSIS — I44.2 INTERMITTENT COMPLETE HEART BLOCK (HCC): ICD-10-CM

## 2024-12-04 DIAGNOSIS — I47.20 VENTRICULAR TACHYCARDIA (HCC): Primary | ICD-10-CM

## 2024-12-04 DIAGNOSIS — R07.2 PRECORDIAL PAIN: ICD-10-CM

## 2024-12-04 PROCEDURE — 99214 OFFICE O/P EST MOD 30 MIN: CPT | Performed by: INTERNAL MEDICINE

## 2024-12-04 NOTE — PROGRESS NOTES
" Patient ID: Dayanna Ortez is a 74 y.o. female.        Plan:      Assessment & Plan  Ventricular tachycardia (HCC)  13 seconds. No associated symptoms.   Given this and DM, will obtain a Lexiscan Myoview.  Precordial pain  Need to r/o Cad.  Benign essential HTN  Controlled.  Intermittent complete heart block (HCC)  Change out Medtronic DDD 8/22/2023.      Follow up Plan/Other summary comments:  Return in about 1 year (around 12/4/2025).    HPI: Patient seen in follow-up today.  She recently was in [place] helping out her brother who had CABG.  He introduced more disciplined eating in between that and Ozempic she has lost some weight.  Ambulation is limited by knee issues however.  Device monitoring revealed 13 seconds of ventricular tachycardia but no associated symptoms.  On another occasion however she had some tightness in the chest that brought her to the ED but has not recurred.    A Medtronic pacemaker was placed in June of 2012.     This was replaced on 8/22/2023.        Most recent or relevant cardiac/vascular testing:    Myoview 03/09/2016: Normal.       Past Surgical History:   Procedure Laterality Date    A-V CARDIAC PACEMAKER INSERTION  06/2012    dual chamber Medtronic    BACK SURGERY      CARDIAC PACEMAKER PLACEMENT      CARDIAC PACEMAKER PLACEMENT Left 8/22/2023    Procedure: REMOVE AND INSERTION PACEMAKER CHANGE OUT;  Surgeon: Mikie Landeros MD;  Location: [facility];  Service: Cardiology    TONSILLECTOMY         Lipid Profile: Reviewed      Review of Systems   10  point ROS  was otherwise non pertinent or negative except as per HPI or as below.   Gait: Normal.        Objective:     /70   Pulse 80   Ht 5' 2\" (1.575 m)   Wt 101 kg (223 lb)   BMI 40.79 kg/m²     PHYSICAL EXAM:    General:  Normal appearance in no distress.  Eyes:  Anicteric.  Oral mucosa:  Moist.  Neck:  No JVD. Carotid upstrokes are brisk without bruits.  No masses.  Chest:  Clear to auscultation.  Pacemaker left " subclavian region well-healed.  Cardiac:  No palpable PMI.  Normal S1 and S2.  No murmur gallop or rub.  Abdomen:  Soft and nontender. No palpable organomegaly or aortic enlargement.  Extremities:  No peripheral edema.  Musculoskeletal:  Symmetric.   Vascular: Popliteal pulses are intact bilaterally.   Pedal pulses are intact.  Neuro:  Grossly symmetric.  Psych:  Alert and oriented x3.      Meds reviewed.    Past Medical History:   Diagnosis Date    Atrial fibrillation (HCC)     paroxsysmal    Diabetes mellitus (HCC)     Type 1    History of echocardiogram 08/16/2011    EF 55%, Normal study    History of nuclear stress test 03/09/2016    EF 63%, Normal.    Hyperlipidemia     Hypertension     Second degree heart block 2012    s/p dual-chamber Medtronic pacemaker 6/2012           Social History     Tobacco Use   Smoking Status Never   Smokeless Tobacco Never

## 2024-12-10 ENCOUNTER — HOSPITAL ENCOUNTER (OUTPATIENT)
Dept: NUCLEAR MEDICINE | Facility: HOSPITAL | Age: 74
Discharge: HOME | End: 2024-12-10
Attending: INTERNAL MEDICINE
Payer: MEDICARE

## 2024-12-10 ENCOUNTER — HOSPITAL ENCOUNTER (OUTPATIENT)
Dept: NON INVASIVE DIAGNOSTICS | Facility: HOSPITAL | Age: 74
Discharge: HOME | End: 2024-12-10
Attending: INTERNAL MEDICINE
Payer: MEDICARE

## 2024-12-10 VITALS
HEIGHT: 62 IN | OXYGEN SATURATION: 99 % | HEART RATE: 81 BPM | RESPIRATION RATE: 20 BRPM | DIASTOLIC BLOOD PRESSURE: 84 MMHG | SYSTOLIC BLOOD PRESSURE: 146 MMHG | BODY MASS INDEX: 41.04 KG/M2 | WEIGHT: 223 LBS

## 2024-12-10 DIAGNOSIS — I47.20 VENTRICULAR TACHYCARDIA (HCC): ICD-10-CM

## 2024-12-10 DIAGNOSIS — R07.2 PRECORDIAL PAIN: ICD-10-CM

## 2024-12-10 LAB
ARRHY DURING EX: NORMAL
CHEST PAIN STATEMENT: NORMAL
MAX DIASTOLIC BP: 84 MMHG
MAX PREDICTED HEART RATE: 145 BPM
NUC STRESS EJECTION FRACTION: 68 %
PROTOCOL NAME: NORMAL
RATE PRESSURE PRODUCT: NORMAL
REASON FOR TERMINATION: NORMAL
SL CV REST NUCLEAR ISOTOPE DOSE: 11 MCI
SL CV STRESS NUCLEAR ISOTOPE DOSE: 32.4 MCI
SL CV STRESS RECOVERY BP: NORMAL MMHG
SL CV STRESS RECOVERY HR: 91 BPM
SL CV STRESS RECOVERY O2 SAT: 99 %
STRESS ANGINA INDEX: 0
STRESS BASELINE BP: NORMAL MMHG
STRESS BASELINE HR: 81 BPM
STRESS O2 SAT REST: 99 %
STRESS PEAK HR: 116 BPM
STRESS POST EXERCISE DUR MIN: 4 MIN
STRESS POST EXERCISE DUR SEC: 59 SEC
STRESS POST O2 SAT PEAK: 100 %
STRESS POST PEAK BP: 130 MMHG
STRESS POST PEAK HR: 116 BPM
STRESS POST PEAK SYSTOLIC BP: 146 MMHG
STRESS/REST PERFUSION RATIO: 1.27
TARGET HR FORMULA: NORMAL
TEST INDICATION: NORMAL

## 2024-12-10 PROCEDURE — 93018 CV STRESS TEST I&R ONLY: CPT | Performed by: INTERNAL MEDICINE

## 2024-12-10 PROCEDURE — 93016 CV STRESS TEST SUPVJ ONLY: CPT | Performed by: INTERNAL MEDICINE

## 2024-12-10 PROCEDURE — 78452 HT MUSCLE IMAGE SPECT MULT: CPT

## 2024-12-10 PROCEDURE — 93017 CV STRESS TEST TRACING ONLY: CPT

## 2024-12-10 PROCEDURE — A9502 TC99M TETROFOSMIN: HCPCS

## 2024-12-10 RX ORDER — REGADENOSON 0.08 MG/ML
0.4 INJECTION, SOLUTION INTRAVENOUS ONCE
Status: COMPLETED | OUTPATIENT
Start: 2024-12-10 | End: 2024-12-10

## 2024-12-10 RX ADMIN — REGADENOSON 0.4 MG: 0.08 INJECTION, SOLUTION INTRAVENOUS at 09:10

## 2024-12-16 ENCOUNTER — RESULTS FOLLOW-UP (OUTPATIENT)
Dept: CARDIOLOGY CLINIC | Facility: CLINIC | Age: 74
End: 2024-12-16

## 2024-12-17 ENCOUNTER — IN-CLINIC DEVICE VISIT (OUTPATIENT)
Dept: CARDIOLOGY CLINIC | Facility: CLINIC | Age: 74
End: 2024-12-17
Payer: MEDICARE

## 2024-12-17 DIAGNOSIS — I49.5 SICK SINUS SYNDROME (HCC): Primary | ICD-10-CM

## 2024-12-17 DIAGNOSIS — Z45.010 ENCOUNTER FOR CHECKING AND TESTING OF CARDIAC PACEMAKER PULSE GENERATOR (BATTERY): ICD-10-CM

## 2024-12-17 PROCEDURE — 93280 PM DEVICE PROGR EVAL DUAL: CPT | Performed by: INTERNAL MEDICINE

## 2024-12-26 ENCOUNTER — OFFICE VISIT (OUTPATIENT)
Dept: URGENT CARE | Facility: CLINIC | Age: 74
End: 2024-12-26
Payer: MEDICARE

## 2024-12-26 VITALS
RESPIRATION RATE: 22 BRPM | HEART RATE: 78 BPM | OXYGEN SATURATION: 98 % | SYSTOLIC BLOOD PRESSURE: 130 MMHG | TEMPERATURE: 98 F | DIASTOLIC BLOOD PRESSURE: 80 MMHG

## 2024-12-26 DIAGNOSIS — S61.012A LACERATION OF LEFT THUMB WITHOUT FOREIGN BODY WITHOUT DAMAGE TO NAIL, INITIAL ENCOUNTER: Primary | ICD-10-CM

## 2024-12-26 DIAGNOSIS — Z23 ENCOUNTER FOR IMMUNIZATION: ICD-10-CM

## 2024-12-26 PROCEDURE — 90715 TDAP VACCINE 7 YRS/> IM: CPT

## 2024-12-26 PROCEDURE — 12001 RPR S/N/AX/GEN/TRNK 2.5CM/<: CPT | Performed by: NURSE PRACTITIONER

## 2024-12-26 PROCEDURE — G0463 HOSPITAL OUTPT CLINIC VISIT: HCPCS | Performed by: NURSE PRACTITIONER

## 2024-12-26 PROCEDURE — 99214 OFFICE O/P EST MOD 30 MIN: CPT | Performed by: NURSE PRACTITIONER

## 2024-12-26 RX ORDER — CEPHALEXIN 500 MG/1
500 CAPSULE ORAL EVERY 12 HOURS SCHEDULED
Qty: 14 CAPSULE | Refills: 0 | Status: SHIPPED | OUTPATIENT
Start: 2024-12-26 | End: 2025-01-02

## 2024-12-26 NOTE — PATIENT INSTRUCTIONS
You have had sutures placed.  You are to have them removed in 7-10 days.  You are to apply plain bacitracin to the sutures x 3-4 days.   You are to keep dry.  Wear gloves if need to be in water.  You are to monitor for redness, infection, worsening pain.   Attempt to leave open to air as much as possible so they are not soggy and dry up and heal   You are to take the antibiotic as prescribed.   Follow up with your PCP in 3-5 days  Go to the ED if symptoms worsen   You may return here for suture removal    You have 8 sutures in your left thumb     Cephalexin sent to pharmacy due to pt stating she is a bad diabetic     You have been given a tetanus today.

## 2024-12-26 NOTE — PROGRESS NOTES
[facility]        NAME: Dayanna Ortez is a 74 y.o. female  : 1950    MRN: [id number]  DATE: 2024  TIME: 2:57 PM    Assessment and Plan   Laceration of left thumb without foreign body without damage to nail, initial encounter [S61.012A]  1. Laceration of left thumb without foreign body without damage to nail, initial encounter  Tdap Vaccine greater than or equal to 6yo    Laceration repair    cephalexin (KEFLEX) 500 mg capsule      2. Encounter for immunization  Tdap Vaccine greater than or equal to 6yo    Laceration repair    cephalexin (KEFLEX) 500 mg capsule            Patient Instructions       Follow up with PCP in 3-5 days.  Proceed to  ER if symptoms worsen.    If tests have been performed at [facility], our office will contact you with results if changes need to be made to the care plan discussed with you at the visit.  [facility]    You have had sutures placed.  You are to have them removed in 7-10 days.  You are to apply plain bacitracin to the sutures x 3-4 days.   You are to keep dry.  Wear gloves if need to be in water.  You are to monitor for redness, infection, worsening pain.   Attempt to leave open to air as much as possible so they are not soggy and dry up and heal   You are to take the antibiotic as prescribed.   Follow up with your PCP in 3-5 days  Go to the ED if symptoms worsen   You may return here for suture removal    You have 8 sutures in your left thumb     Cephalexin sent to pharmacy due to pt stating she is a bad diabetic     You have been given a tetanus today.        Chief Complaint     Chief Complaint   Patient presents with    Thumb Laceration         History of Present Illness       This is a 74 year old female who was cutting up bread for the deer this am and cut her left thumb with a knife. She has no idea when her last Td was.  She states she is on blood thinner so she has been unable to get it to stop  bleeding.   She states she is able to move her thumb.  PMH is listed and reviewed.           Review of Systems   Review of Systems   Constitutional: Negative.    HENT: Negative.     Eyes: Negative.    Respiratory: Negative.     Cardiovascular: Negative.    Gastrointestinal: Negative.    Endocrine: Negative.    Genitourinary: Negative.    Musculoskeletal: Negative.    Skin:  Positive for wound.   Allergic/Immunologic: Negative.    Neurological: Negative.    Hematological: Negative.    Psychiatric/Behavioral: Negative.           Current Medications       Current Outpatient Medications:     cephalexin (KEFLEX) 500 mg capsule, Take 1 capsule (500 mg total) by mouth every 12 (twelve) hours for 7 days, Disp: 14 capsule, Rfl: 0    atorvastatin (LIPITOR) 10 mg tablet, Take 10 mg by mouth daily, Disp: , Rfl:     cholecalciferol (VITAMIN D3) 1,000 units tablet, Take 1,000 Units by mouth daily, Disp: , Rfl:     glipiZIDE (GLUCOTROL) 5 mg tablet, TAKE 1 TABLET BY MOUTH TWICE A DAY  BEFORE BREAKFAST AND SUPPER (2 TABLETS TOTAL DAILY), Disp: , Rfl:     hydrochlorothiazide (HYDRODIURIL) 25 mg tablet, Take 25 mg by mouth daily, Disp: , Rfl:     levothyroxine 100 mcg tablet, Take 100 mcg by mouth daily, Disp: , Rfl:     levothyroxine 75 mcg tablet, Take 75 mcg by mouth daily in the early morning, Disp: , Rfl:     lisinopril (ZESTRIL) 2.5 mg tablet, Take 2.5 mg by mouth daily, Disp: , Rfl:     metoprolol succinate (TOPROL-XL) 50 mg 24 hr tablet, Take 50 mg by mouth 2 (two) times a day, Disp: , Rfl:     rosuvastatin (CRESTOR) 5 mg tablet, Take 5 mg by mouth daily (Patient not taking: Reported on 12/10/2024), Disp: , Rfl:     semaglutide, 0.25 or 0.5 mg/dose, (Ozempic, 0.25 or 0.5 MG/DOSE,) 2 mg/3 mL injection pen, Inject 0.5 mg under the skin, Disp: , Rfl:     Soliqua 100-33 UNT-MCG/ML injection pen, INJECTS 33 UNITS AT BEDTIME   STOP TOUJEO, Disp: , Rfl:     traMADol (ULTRAM) 50 mg tablet, TAKE ONE (1) TABLET BY MOUTH DAILY AS  NEEDED, Disp: , Rfl:     Xarelto 20 MG tablet, Take 20 mg by mouth daily, Disp: , Rfl:     Current Allergies     Allergies as of 12/26/2024    (No Known Allergies)            The following portions of the patient's history were reviewed and updated as appropriate: allergies, current medications, past family history, past medical history, past social history, past surgical history and problem list.     Past Medical History:   Diagnosis Date    Atrial fibrillation (HCC)     paroxsysmal    Diabetes mellitus (HCC)     Type 1    History of echocardiogram 08/16/2011    EF 55%, Normal study    History of nuclear stress test 03/09/2016    EF 63%, Normal.    Hyperlipidemia     Hypertension     Second degree heart block 2012    s/p dual-chamber Medtronic pacemaker 6/2012    Ventricular tachycardia (HCC)        Past Surgical History:   Procedure Laterality Date    A-V CARDIAC PACEMAKER INSERTION  06/2012    dual chamber Medtronic    BACK SURGERY      CARDIAC PACEMAKER PLACEMENT      CARDIAC PACEMAKER PLACEMENT Left 8/22/2023    Procedure: REMOVE AND INSERTION PACEMAKER CHANGE OUT;  Surgeon: Mikie Landeros MD;  Location: [facility];  Service: Cardiology    TONSILLECTOMY         Family History   Problem Relation Age of Onset    Cervical cancer Mother     Leukemia Father     Diabetes Brother          Medications have been verified.        Objective   /80   Pulse 78   Temp 98 °F (36.7 °C)   Resp 22   SpO2 98%   No LMP recorded. Patient is postmenopausal.       Physical Exam     Physical Exam  Vitals and nursing note reviewed.   Constitutional:       General: She is not in acute distress.     Appearance: Normal appearance. She is obese. She is not ill-appearing, toxic-appearing or diaphoretic.   HENT:      Head: Normocephalic and atraumatic.   Eyes:      Extraocular Movements: Extraocular movements intact.   Cardiovascular:      Rate and Rhythm: Normal rate.      Pulses: Normal pulses.   Pulmonary:      Effort:  "Pulmonary effort is normal.   Musculoskeletal:         General: Normal range of motion.        Hands:       Cervical back: Normal range of motion.   Skin:     General: Skin is warm.      Capillary Refill: Capillary refill takes less than 2 seconds.   Neurological:      General: No focal deficit present.      Mental Status: She is alert and oriented to person, place, and time.   Psychiatric:         Mood and Affect: Mood is anxious.         Behavior: Behavior normal.         Thought Content: Thought content normal.         Judgment: Judgment normal.           Universal Protocol:  procedure performed by consultantConsent: The procedure was performed in an emergent situation. Verbal consent obtained. Written consent obtained.  Risks and benefits: risks, benefits and alternatives were discussed  Consent given by: patient  Time out: Immediately prior to procedure a \"time out\" was called to verify the correct patient, procedure, equipment, support staff and site/side marked as required.  Timeout called at: 12/26/2024 1:40 PM.  Patient understanding: patient states understanding of the procedure being performed  Patient consent: the patient's understanding of the procedure matches consent given  Procedure consent: procedure consent matches procedure scheduled  Relevant documents: relevant documents present and verified  Test results: test results available and properly labeled  Site marked: the operative site was marked  Required items: required blood products, implants, devices, and special equipment available  Patient identity confirmed: verbally with patient and provided demographic data  Laceration repair    Date/Time: 12/26/2024 1:40 PM    Performed by: YAQUELIN Shahid  Authorized by: YAQUELIN Shahid  Body area: upper extremity  Location details: left thumb  Laceration length: 2.5 cm  Foreign bodies: no foreign bodies  Tendon involvement: none  Nerve involvement: none  Vascular damage: " no  Anesthesia: digital block    Anesthesia:  Local Anesthetic: lidocaine 1% without epinephrine  Anesthetic total: 4 mL    Sedation:  Patient sedated: no        Procedure Details:  Preparation: Patient was prepped and draped in the usual sterile fashion (NSS and betadine).  Irrigation solution: saline  Irrigation method: syringe  Amount of cleaning: standard  Debridement: none  Degree of undermining: none  Skin closure: 5-0 nylon  Number of sutures: 8  Technique: knot tying  Approximation: close  Approximation difficulty: simple  Dressing: 4x4 sterile gauze, gauze roll and antibiotic ointment  Patient tolerance: patient tolerated the procedure well with no immediate complications  Comments: 2 sutures through nail bed

## 2025-01-06 ENCOUNTER — OFFICE VISIT (OUTPATIENT)
Dept: URGENT CARE | Facility: CLINIC | Age: 75
End: 2025-01-06
Payer: MEDICARE

## 2025-01-06 VITALS
SYSTOLIC BLOOD PRESSURE: 128 MMHG | WEIGHT: 223 LBS | DIASTOLIC BLOOD PRESSURE: 80 MMHG | TEMPERATURE: 98.2 F | HEIGHT: 62 IN | BODY MASS INDEX: 41.04 KG/M2 | HEART RATE: 78 BPM

## 2025-01-06 DIAGNOSIS — Z48.02 ENCOUNTER FOR REMOVAL OF SUTURES: Primary | ICD-10-CM

## 2025-01-06 DIAGNOSIS — S61.012D: ICD-10-CM

## 2025-01-06 NOTE — PATIENT INSTRUCTIONS
You have had all your sutures removed from the left thumb.  It is not infected and has healed well.    Take tylenol for pain if needed.    Follow up with your PCP in 3-5 days  Go to the ED if symptoms worsen

## 2025-01-06 NOTE — PROGRESS NOTES
[facility]        NAME: Dayanna Ortez is a 74 y.o. female  : 1950    MRN: [id number]  DATE: 2025  TIME: 10:07 AM    Assessment and Plan   Encounter for removal of sutures [Z48.02]  1. Encounter for removal of sutures        2. Laceration of thumb without foreign body without damage to nail, left, subsequent encounter              Patient Instructions       Follow up with PCP in 3-5 days.  Proceed to  ER if symptoms worsen.    If tests have been performed at [facility], our office will contact you with results if changes need to be made to the care plan discussed with you at the visit.  You can review your full results on [facility]'s MyChart.    You have had all your sutures removed from the left thumb.  It is not infected and has healed well.    Take tylenol for pain if needed.    Follow up with your PCP in 3-5 days  Go to the ED if symptoms worsen         Chief Complaint     Chief Complaint   Patient presents with    Suture / Staple Removal     Sutures intact ,denies drainage         History of Present Illness       This is a 74 year old female who comes to [facility] for suture removal. Pt had sutures placed in left thumb 10 days ago and need to be removed today. She denies any complications.  PMH is listed and reviewed.     Suture / Staple Removal        Review of Systems   Review of Systems   Constitutional: Negative.    Musculoskeletal: Negative.    Skin:  Positive for wound.         Current Medications       Current Outpatient Medications:     atorvastatin (LIPITOR) 10 mg tablet, Take 10 mg by mouth daily, Disp: , Rfl:     cholecalciferol (VITAMIN D3) 1,000 units tablet, Take 1,000 Units by mouth daily, Disp: , Rfl:     glipiZIDE (GLUCOTROL) 5 mg tablet, TAKE 1 TABLET BY MOUTH TWICE A DAY  BEFORE BREAKFAST AND SUPPER (2 TABLETS TOTAL DAILY), Disp: , Rfl:     hydrochlorothiazide (HYDRODIURIL) 25 mg tablet, Take 25 mg by mouth daily, Disp: , Rfl:     levothyroxine 100 mcg tablet,  Take 100 mcg by mouth daily, Disp: , Rfl:     levothyroxine 75 mcg tablet, Take 75 mcg by mouth daily in the early morning, Disp: , Rfl:     lisinopril (ZESTRIL) 2.5 mg tablet, Take 2.5 mg by mouth daily, Disp: , Rfl:     metoprolol succinate (TOPROL-XL) 50 mg 24 hr tablet, Take 50 mg by mouth 2 (two) times a day, Disp: , Rfl:     rosuvastatin (CRESTOR) 5 mg tablet, Take 5 mg by mouth daily (Patient not taking: Reported on 12/10/2024), Disp: , Rfl:     semaglutide, 0.25 or 0.5 mg/dose, (Ozempic, 0.25 or 0.5 MG/DOSE,) 2 mg/3 mL injection pen, Inject 0.5 mg under the skin, Disp: , Rfl:     Soliqua 100-33 UNT-MCG/ML injection pen, INJECTS 33 UNITS AT BEDTIME   STOP TOUJEO, Disp: , Rfl:     traMADol (ULTRAM) 50 mg tablet, TAKE ONE (1) TABLET BY MOUTH DAILY AS NEEDED, Disp: , Rfl:     Xarelto 20 MG tablet, Take 20 mg by mouth daily, Disp: , Rfl:     Current Allergies     Allergies as of 01/06/2025    (No Known Allergies)            The following portions of the patient's history were reviewed and updated as appropriate: allergies, current medications, past family history, past medical history, past social history, past surgical history and problem list.     Past Medical History:   Diagnosis Date    Atrial fibrillation (HCC)     paroxsysmal    Diabetes mellitus (HCC)     Type 1    History of echocardiogram 08/16/2011    EF 55%, Normal study    History of nuclear stress test 03/09/2016    EF 63%, Normal.    Hyperlipidemia     Hypertension     Second degree heart block 2012    s/p dual-chamber Medtronic pacemaker 6/2012    Ventricular tachycardia (HCC)        Past Surgical History:   Procedure Laterality Date    A-V CARDIAC PACEMAKER INSERTION  06/2012    dual chamber Medtronic    BACK SURGERY      CARDIAC PACEMAKER PLACEMENT      CARDIAC PACEMAKER PLACEMENT Left 8/22/2023    Procedure: REMOVE AND INSERTION PACEMAKER CHANGE OUT;  Surgeon: Mikie Landeros MD;  Location: [facility];  Service: Cardiology    TONSILLECTOMY    "      Family History   Problem Relation Age of Onset    Cervical cancer Mother     Leukemia Father     Diabetes Brother          Medications have been verified.        Objective   /80   Pulse 78   Temp 98.2 °F (36.8 °C)   Ht 5' 2\" (1.575 m)   Wt 101 kg (223 lb)   BMI 40.79 kg/m²   No LMP recorded. Patient is postmenopausal.       Physical Exam     Physical Exam  Vitals and nursing note reviewed.   Constitutional:       General: She is not in acute distress.     Appearance: Normal appearance. She is obese. She is not ill-appearing, toxic-appearing or diaphoretic.   HENT:      Head: Normocephalic and atraumatic.      Nose: Nose normal.      Mouth/Throat:      Mouth: Mucous membranes are moist.   Eyes:      Extraocular Movements: Extraocular movements intact.   Cardiovascular:      Rate and Rhythm: Normal rate.      Pulses: Normal pulses.   Pulmonary:      Effort: Pulmonary effort is normal.   Musculoskeletal:         General: Normal range of motion.      Cervical back: Normal range of motion.   Skin:     General: Skin is warm and dry.      Capillary Refill: Capillary refill takes less than 2 seconds.   Neurological:      General: No focal deficit present.      Mental Status: She is alert and oriented to person, place, and time.   Psychiatric:         Mood and Affect: Mood normal.         Behavior: Behavior normal.         Thought Content: Thought content normal.         Judgment: Judgment normal.             Suture removal    Date/Time: 1/6/2025 9:55 AM    Performed by: YAQUELIN Shahid  Authorized by: YAQUELIN Shahid  [place] Protocol:  Procedure performed by: (Ricco RN  - need to hold pt's left hand down due to shaking and histrionic behavior)  Consent: Verbal consent obtained. Written consent obtained.  Risks and benefits: risks, benefits and alternatives were discussed  Consent given by: patient  Time out: Immediately prior to procedure a \"time out\" was called to verify the " correct patient, procedure, equipment, support staff and site/side marked as required.  Timeout called at: 1/6/2025 9:55 AM.  Patient understanding: patient states understanding of the procedure being performed  Patient consent: the patient's understanding of the procedure matches consent given  Procedure consent: procedure consent matches procedure scheduled  Relevant documents: relevant documents present and verified  Test results: test results available and properly labeled  Site marked: the operative site was marked  Required items: required blood products, implants, devices, and special equipment available  Patient identity confirmed: verbally with patient and provided demographic data      Patient location:  Clinic  Location:     Laterality:  Left    Location:  Upper extremity    Upper extremity location:  Hand    Hand location:  L thumb  Procedure details:     Tools used:  Suture removal kit    Wound appearance:  No sign(s) of infection, good wound healing and clean    Number of sutures removed:  8  Post-procedure details:     Post-removal:  Band-Aid applied    Patient tolerance of procedure:  Tolerated with difficulty

## 2025-03-18 ENCOUNTER — RESULTS FOLLOW-UP (OUTPATIENT)
Dept: CARDIOLOGY CLINIC | Facility: CLINIC | Age: 75
End: 2025-03-18

## 2025-03-18 ENCOUNTER — REMOTE DEVICE CLINIC VISIT (OUTPATIENT)
Dept: CARDIOLOGY CLINIC | Facility: CLINIC | Age: 75
End: 2025-03-18
Payer: MEDICARE

## 2025-03-18 DIAGNOSIS — Z95.0 PRESENCE OF CARDIAC PACEMAKER: Primary | ICD-10-CM

## 2025-03-18 PROCEDURE — 93294 REM INTERROG EVL PM/LDLS PM: CPT | Performed by: INTERNAL MEDICINE

## 2025-03-18 NOTE — PROGRESS NOTES
Results for orders placed or performed in visit on 03/18/25   Cardiac EP device report    Narrative    MDT DC PM/NOT MRI CONDITIONAL  CARELINK TRANSMISSION: BATTERY VOLTAGE ADEQUATE (11.4 YR). AP 6.1%.  1.4%. KNOWN HIGH ATRIAL THRESHOLD; ALL OTHER LEAD PARAMETERS WITHIN NORMAL LIMITS. NO SIGNIFICANT HIGH RATE EPISODES. 0.7 PVC'S/H. NORMAL DEVICE FUNCTION. C[facility]/NC

## 2025-05-05 ENCOUNTER — TELEPHONE (OUTPATIENT)
Dept: CARDIOLOGY CLINIC | Facility: CLINIC | Age: 75
End: 2025-05-05

## 2025-06-17 ENCOUNTER — REMOTE DEVICE CLINIC VISIT (OUTPATIENT)
Dept: CARDIOLOGY CLINIC | Facility: CLINIC | Age: 75
End: 2025-06-17
Payer: MEDICARE

## 2025-06-17 ENCOUNTER — RESULTS FOLLOW-UP (OUTPATIENT)
Dept: CARDIOLOGY CLINIC | Facility: CLINIC | Age: 75
End: 2025-06-17

## 2025-06-17 DIAGNOSIS — Z95.0 CARDIAC PACEMAKER IN SITU: Primary | ICD-10-CM

## 2025-06-17 PROCEDURE — 93296 REM INTERROG EVL PM/IDS: CPT | Performed by: INTERNAL MEDICINE

## 2025-06-17 PROCEDURE — 93294 REM INTERROG EVL PM/LDLS PM: CPT | Performed by: INTERNAL MEDICINE

## 2025-06-17 NOTE — PROGRESS NOTES
"Results for orders placed or performed in visit on 06/17/25   Cardiac EP device report    Narrative    MDT DC PM/NOT MRI CONDITIONAL  CARELINK TRANSMISSION: PRESENTING EGRAM AS VS@ 83 BPM. BATTERY STATUS \"11 YRS.\" AP 7%  2%. ALL AVAILABLE LEAD PARAMETERS WITHIN NORMAL LIMITS. NO SIGNIFICANT HIGH RATE EPISODES. NORMAL DEVICE FUNCTION. NC         "

## (undated) DEVICE — BETHLEHEM UNIVERSAL MINOR GEN: Brand: CARDINAL HEALTH

## (undated) DEVICE — 3M™ STERI-DRAPE™ INCISE DRAPE 1040 (35CM X 35CM): Brand: STERI-DRAPE™

## (undated) DEVICE — INTENDED FOR TISSUE SEPARATION, AND OTHER PROCEDURES THAT REQUIRE A SHARP SURGICAL BLADE TO PUNCTURE OR CUT.: Brand: BARD-PARKER ® CARBON RIB-BACK BLADES

## (undated) DEVICE — NEPTUNE E-SEP SMOKE EVACUATION PENCIL, COATED, 70MM BLADE, PUSH BUTTON SWITCH: Brand: NEPTUNE E-SEP

## (undated) DEVICE — VIOLET BRAIDED (POLYGLACTIN 910), SYNTHETIC ABSORBABLE SUTURE: Brand: COATED VICRYL

## (undated) DEVICE — 3M™ DEFIBRULATOR PADS 2346N: Brand: 3M™

## (undated) DEVICE — GLOVE SRG BIOGEL 7.5

## (undated) DEVICE — DECANTER: Brand: UNBRANDED

## (undated) DEVICE — ADHESIVE SKIN HIGH VISCOSITY EXOFIN 1ML

## (undated) DEVICE — SUT VICRYL 4-0 SH-1 27 IN J218H

## (undated) DEVICE — SUT SILK 0 CT-1 30 IN 424H

## (undated) DEVICE — CHLORAPREP HI-LITE 26ML ORANGE

## (undated) DEVICE — NEEDLE 25G X 1 1/2